# Patient Record
Sex: MALE | Race: WHITE | Employment: OTHER | URBAN - METROPOLITAN AREA
[De-identification: names, ages, dates, MRNs, and addresses within clinical notes are randomized per-mention and may not be internally consistent; named-entity substitution may affect disease eponyms.]

---

## 2019-02-11 ENCOUNTER — APPOINTMENT (OUTPATIENT)
Dept: GENERAL RADIOLOGY | Age: 84
DRG: 291 | End: 2019-02-11
Attending: EMERGENCY MEDICINE
Payer: MEDICARE

## 2019-02-11 ENCOUNTER — HOSPITAL ENCOUNTER (INPATIENT)
Age: 84
LOS: 5 days | Discharge: NURSING, REHAB OR LONG-TERM CARE | DRG: 291 | End: 2019-02-16
Attending: EMERGENCY MEDICINE | Admitting: HOSPITALIST
Payer: MEDICARE

## 2019-02-11 DIAGNOSIS — N18.9 CHRONIC KIDNEY DISEASE, UNSPECIFIED CKD STAGE: Primary | ICD-10-CM

## 2019-02-11 DIAGNOSIS — I48.91 NEW ONSET ATRIAL FIBRILLATION (HCC): ICD-10-CM

## 2019-02-11 DIAGNOSIS — I50.9 CONGESTIVE HEART FAILURE, UNSPECIFIED HF CHRONICITY, UNSPECIFIED HEART FAILURE TYPE (HCC): ICD-10-CM

## 2019-02-11 PROBLEM — N18.30 CKD (CHRONIC KIDNEY DISEASE) STAGE 3, GFR 30-59 ML/MIN (HCC): Status: ACTIVE | Noted: 2019-02-11

## 2019-02-11 LAB
ALBUMIN SERPL-MCNC: 3.9 G/DL (ref 3.4–5)
ALBUMIN/GLOB SERPL: 1.2 {RATIO} (ref 0.8–1.7)
ALP SERPL-CCNC: 116 U/L (ref 45–117)
ALT SERPL-CCNC: 22 U/L (ref 16–61)
ANION GAP SERPL CALC-SCNC: 8 MMOL/L (ref 3–18)
APTT PPP: 35.1 SEC (ref 23–36.4)
AST SERPL-CCNC: 13 U/L (ref 15–37)
BASOPHILS # BLD: 0 K/UL (ref 0–0.1)
BASOPHILS NFR BLD: 0 % (ref 0–2)
BILIRUB SERPL-MCNC: 0.5 MG/DL (ref 0.2–1)
BNP SERPL-MCNC: 8445 PG/ML (ref 0–1800)
BUN SERPL-MCNC: 56 MG/DL (ref 7–18)
BUN/CREAT SERPL: 19 (ref 12–20)
CALCIUM SERPL-MCNC: 8.4 MG/DL (ref 8.5–10.1)
CHLORIDE SERPL-SCNC: 108 MMOL/L (ref 100–108)
CK MB CFR SERPL CALC: 4.8 % (ref 0–4)
CK MB CFR SERPL CALC: 5 % (ref 0–4)
CK MB SERPL-MCNC: 4.3 NG/ML (ref 5–25)
CK MB SERPL-MCNC: 5.3 NG/ML (ref 5–25)
CK SERPL-CCNC: 111 U/L (ref 39–308)
CK SERPL-CCNC: 86 U/L (ref 39–308)
CO2 SERPL-SCNC: 22 MMOL/L (ref 21–32)
CREAT SERPL-MCNC: 2.88 MG/DL (ref 0.6–1.3)
DIFFERENTIAL METHOD BLD: ABNORMAL
EOSINOPHIL # BLD: 0.2 K/UL (ref 0–0.4)
EOSINOPHIL NFR BLD: 1 % (ref 0–5)
ERYTHROCYTE [DISTWIDTH] IN BLOOD BY AUTOMATED COUNT: 15.5 % (ref 11.6–14.5)
GLOBULIN SER CALC-MCNC: 3.2 G/DL (ref 2–4)
GLUCOSE SERPL-MCNC: 101 MG/DL (ref 74–99)
HCT VFR BLD AUTO: 33.1 % (ref 36–48)
HGB BLD-MCNC: 10.2 G/DL (ref 13–16)
LYMPHOCYTES # BLD: 1.1 K/UL (ref 0.9–3.6)
LYMPHOCYTES NFR BLD: 9 % (ref 21–52)
MAGNESIUM SERPL-MCNC: 2.7 MG/DL (ref 1.6–2.6)
MCH RBC QN AUTO: 27.3 PG (ref 24–34)
MCHC RBC AUTO-ENTMCNC: 30.8 G/DL (ref 31–37)
MCV RBC AUTO: 88.7 FL (ref 74–97)
MONOCYTES # BLD: 1.3 K/UL (ref 0.05–1.2)
MONOCYTES NFR BLD: 10 % (ref 3–10)
NEUTS SEG # BLD: 10.2 K/UL (ref 1.8–8)
NEUTS SEG NFR BLD: 80 % (ref 40–73)
PLATELET # BLD AUTO: 340 K/UL (ref 135–420)
PMV BLD AUTO: 11.2 FL (ref 9.2–11.8)
POTASSIUM SERPL-SCNC: 4.6 MMOL/L (ref 3.5–5.5)
PROT SERPL-MCNC: 7.1 G/DL (ref 6.4–8.2)
RBC # BLD AUTO: 3.73 M/UL (ref 4.7–5.5)
SODIUM SERPL-SCNC: 138 MMOL/L (ref 136–145)
T4 FREE SERPL-MCNC: 1.2 NG/DL (ref 0.7–1.5)
TROPONIN I SERPL-MCNC: 0.05 NG/ML (ref 0–0.04)
TROPONIN I SERPL-MCNC: 0.05 NG/ML (ref 0–0.04)
TSH SERPL DL<=0.05 MIU/L-ACNC: 2 UIU/ML (ref 0.36–3.74)
WBC # BLD AUTO: 12.8 K/UL (ref 4.6–13.2)

## 2019-02-11 PROCEDURE — 96376 TX/PRO/DX INJ SAME DRUG ADON: CPT

## 2019-02-11 PROCEDURE — 99285 EMERGENCY DEPT VISIT HI MDM: CPT

## 2019-02-11 PROCEDURE — 74011250636 HC RX REV CODE- 250/636: Performed by: HOSPITALIST

## 2019-02-11 PROCEDURE — 85730 THROMBOPLASTIN TIME PARTIAL: CPT

## 2019-02-11 PROCEDURE — 96374 THER/PROPH/DIAG INJ IV PUSH: CPT

## 2019-02-11 PROCEDURE — 83880 ASSAY OF NATRIURETIC PEPTIDE: CPT

## 2019-02-11 PROCEDURE — 84443 ASSAY THYROID STIM HORMONE: CPT

## 2019-02-11 PROCEDURE — 71045 X-RAY EXAM CHEST 1 VIEW: CPT

## 2019-02-11 PROCEDURE — 65610000006 HC RM INTENSIVE CARE

## 2019-02-11 PROCEDURE — 74011250636 HC RX REV CODE- 250/636: Performed by: EMERGENCY MEDICINE

## 2019-02-11 PROCEDURE — 74011000258 HC RX REV CODE- 258: Performed by: EMERGENCY MEDICINE

## 2019-02-11 PROCEDURE — 74011000250 HC RX REV CODE- 250: Performed by: EMERGENCY MEDICINE

## 2019-02-11 PROCEDURE — 74011250637 HC RX REV CODE- 250/637: Performed by: EMERGENCY MEDICINE

## 2019-02-11 PROCEDURE — 80053 COMPREHEN METABOLIC PANEL: CPT

## 2019-02-11 PROCEDURE — 83735 ASSAY OF MAGNESIUM: CPT

## 2019-02-11 PROCEDURE — 93005 ELECTROCARDIOGRAM TRACING: CPT

## 2019-02-11 PROCEDURE — 82550 ASSAY OF CK (CPK): CPT

## 2019-02-11 PROCEDURE — 84439 ASSAY OF FREE THYROXINE: CPT

## 2019-02-11 PROCEDURE — 85025 COMPLETE CBC W/AUTO DIFF WBC: CPT

## 2019-02-11 RX ORDER — DILTIAZEM HYDROCHLORIDE 5 MG/ML
0.35 INJECTION INTRAVENOUS
Status: COMPLETED | OUTPATIENT
Start: 2019-02-11 | End: 2019-02-11

## 2019-02-11 RX ORDER — HEPARIN SODIUM 1000 [USP'U]/ML
4000 INJECTION, SOLUTION INTRAVENOUS; SUBCUTANEOUS ONCE
Status: COMPLETED | OUTPATIENT
Start: 2019-02-11 | End: 2019-02-11

## 2019-02-11 RX ORDER — FUROSEMIDE 10 MG/ML
40 INJECTION INTRAMUSCULAR; INTRAVENOUS 2 TIMES DAILY
Status: DISCONTINUED | OUTPATIENT
Start: 2019-02-11 | End: 2019-02-16 | Stop reason: HOSPADM

## 2019-02-11 RX ORDER — METOPROLOL SUCCINATE 25 MG/1
25 TABLET, EXTENDED RELEASE ORAL DAILY
Status: DISCONTINUED | OUTPATIENT
Start: 2019-02-12 | End: 2019-02-13

## 2019-02-11 RX ORDER — DILTIAZEM HYDROCHLORIDE 5 MG/ML
0.25 INJECTION INTRAVENOUS
Status: COMPLETED | OUTPATIENT
Start: 2019-02-11 | End: 2019-02-11

## 2019-02-11 RX ORDER — METOPROLOL TARTRATE 25 MG/1
25 TABLET, FILM COATED ORAL ONCE
Status: COMPLETED | OUTPATIENT
Start: 2019-02-11 | End: 2019-02-11

## 2019-02-11 RX ORDER — PANTOPRAZOLE SODIUM 40 MG/1
40 TABLET, DELAYED RELEASE ORAL DAILY
Status: DISCONTINUED | OUTPATIENT
Start: 2019-02-12 | End: 2019-02-16 | Stop reason: HOSPADM

## 2019-02-11 RX ORDER — TERAZOSIN 5 MG/1
10 CAPSULE ORAL
Status: DISCONTINUED | OUTPATIENT
Start: 2019-02-11 | End: 2019-02-16 | Stop reason: HOSPADM

## 2019-02-11 RX ORDER — FUROSEMIDE 10 MG/ML
40 INJECTION INTRAMUSCULAR; INTRAVENOUS
Status: COMPLETED | OUTPATIENT
Start: 2019-02-11 | End: 2019-02-11

## 2019-02-11 RX ORDER — HEPARIN SODIUM 10000 [USP'U]/100ML
12-25 INJECTION, SOLUTION INTRAVENOUS
Status: DISCONTINUED | OUTPATIENT
Start: 2019-02-11 | End: 2019-02-16 | Stop reason: HOSPADM

## 2019-02-11 RX ORDER — DILTIAZEM HYDROCHLORIDE 60 MG/1
60 TABLET, FILM COATED ORAL
Status: COMPLETED | OUTPATIENT
Start: 2019-02-11 | End: 2019-02-11

## 2019-02-11 RX ADMIN — HEPARIN SODIUM AND DEXTROSE 12 UNITS/KG/HR: 10000; 5 INJECTION INTRAVENOUS at 18:25

## 2019-02-11 RX ADMIN — FUROSEMIDE 40 MG: 10 INJECTION, SOLUTION INTRAMUSCULAR; INTRAVENOUS at 20:11

## 2019-02-11 RX ADMIN — DILTIAZEM HYDROCHLORIDE 17 MG: 5 INJECTION INTRAVENOUS at 16:42

## 2019-02-11 RX ADMIN — METOPROLOL TARTRATE 25 MG: 25 TABLET ORAL at 18:22

## 2019-02-11 RX ADMIN — SODIUM CHLORIDE 250 ML: 900 INJECTION, SOLUTION INTRAVENOUS at 20:06

## 2019-02-11 RX ADMIN — DILTIAZEM HYDROCHLORIDE 60 MG: 60 TABLET, FILM COATED ORAL at 18:23

## 2019-02-11 RX ADMIN — DILTIAZEM HYDROCHLORIDE 24 MG: 5 INJECTION INTRAVENOUS at 17:06

## 2019-02-11 RX ADMIN — HEPARIN SODIUM 4000 UNITS: 1000 INJECTION INTRAVENOUS; SUBCUTANEOUS at 18:27

## 2019-02-11 RX ADMIN — FUROSEMIDE 40 MG: 10 INJECTION, SOLUTION INTRAMUSCULAR; INTRAVENOUS at 18:23

## 2019-02-11 RX ADMIN — SODIUM CHLORIDE 5 MG/HR: 900 INJECTION, SOLUTION INTRAVENOUS at 18:45

## 2019-02-11 NOTE — ED PROVIDER NOTES
EMERGENCY DEPARTMENT HISTORY AND PHYSICAL EXAM 
 
Date: 2/11/2019 Patient Name: Payal Pineda History of Presenting Illness Chief Complaint Patient presents with  Irregular Heart Beat History Provided By: Patient Chief Complaint: Dyspnea on exertion Duration: 2 days Timing: Acute Location: Chest 
Severity: Moderate Associated Symptoms: denies any other associated signs or symptoms Additional History (Context):  
4:20 PM 
Payal Pineda is a 80 y.o. male with PMHX hearing loss presents to the emergency department C/O Dyspnea on exertion onset 2 days ago. Pt denies any other associated signs or symptoms. He sees a Dermatologist for his lower legs. Former smoker, quit at the age of 58. Pt denies chest pain, abdominal pain, fever, vomiting, diarrhea, recent illness, any other PMHx, and any other sxs or complaints. PCP: Micheline Ashley MD 
 
Current Facility-Administered Medications Medication Dose Route Frequency Provider Last Rate Last Dose  furosemide (LASIX) injection 40 mg  40 mg IntraVENous NOW Marquise Starr DO      
 dilTIAZem (CARDIZEM) IR tablet 60 mg  60 mg Oral NOW Marquise Starr DO      
 heparin (porcine) 1,000 unit/mL injection 4,000 Units  4,000 Units IntraVENous ONCE Marquise Starr DO      
 heparin 25,000 units in D5W 250 ml infusion  12-25 Units/kg/hr IntraVENous TITRATE Marquise Starr DO      
 dilTIAZem (CARDIZEM) 100 mg in 0.9% sodium chloride (MBP/ADV) 100 mL infusion  0-15 mg/hr IntraVENous CONTINUOUS Marquise Starr DO      
 metoprolol tartrate (LOPRESSOR) tablet 25 mg  25 mg Oral ONCE Marquise Starr DO      
 
Current Outpatient Medications Medication Sig Dispense Refill  terazosin (HYTRIN) 10 mg capsule Take 10 mg by mouth nightly.     
 calcium-vitamin D (OYSTER SHELL) 500 mg(1,250mg) -200 unit per tablet Take 1 Tab by mouth two (2) times daily (with meals).  cyanocobalamin (VITAMIN B-12) 500 mcg tablet Take 500 mcg by mouth daily. Past History Past Medical History: 
Past Medical History:  
Diagnosis Date  Arrhythmia A-fib. On diltiazem drip.  Chronic kidney disease Cr 2.2 which is chronic - at least since 2011  Hypertension Past Surgical History: 
Past Surgical History:  
Procedure Laterality Date  HX ORTHOPAEDIC Back surgery Family History: 
History reviewed. No pertinent family history. Social History: 
Social History Tobacco Use  Smoking status: Former Smoker  Smokeless tobacco: Never Used Substance Use Topics  Alcohol use: No  
 Drug use: No  
 
 
Allergies: 
No Known Allergies Review of Systems Review of Systems Constitutional: Negative for fever. Respiratory:  
     (+) Dyspnea on exertion Cardiovascular: Negative for chest pain. Gastrointestinal: Negative for abdominal pain, diarrhea and vomiting. Physical Exam  
 
Vitals:  
 02/11/19 1700 02/11/19 1715 02/11/19 1730 02/11/19 1745 BP: 130/87 108/55 126/78 112/59 Pulse: (!) 112 99 96 (!) 102 Resp: 15 19 18 18 SpO2: 99% 97% 98% 97% Weight:      
Height:      
 
Physical Exam  
Nursing note and vitals reviewed. Constitutional: Elderly Caucasion male in no acute distress Head: Normocephalic, Atraumatic Eyes: Pupils are equal, round, and reactive to light, EOMI Neck: Supple, non-tender Cardiovascular: Irregularly irregular, tachycardic Chest: Normal work of breathing and chest excursion bilaterally Lungs: Clear to ausculation bilaterally, no wheezes, no rhonchi Abdomen: Soft, non tender, non distended, normoactive bowel sounds Back: No evidence of trauma or deformity Extremities: +3 pitting edema bilaterally with mild anterior erythema but no purulent discharge Skin: Warm and dry, normal cap refill Neuro: Alert and appropriate, CN intact, normal speech, moving all 4 extremities freely and symmetrically Psychiatric: Normal mood and affect Diagnostic Study Results Labs: 
  
Recent Results (from the past 12 hour(s)) CBC WITH AUTOMATED DIFF Collection Time: 02/11/19  4:20 PM  
Result Value Ref Range WBC 12.8 4.6 - 13.2 K/uL  
 RBC 3.73 (L) 4.70 - 5.50 M/uL  
 HGB 10.2 (L) 13.0 - 16.0 g/dL HCT 33.1 (L) 36.0 - 48.0 % MCV 88.7 74.0 - 97.0 FL  
 MCH 27.3 24.0 - 34.0 PG  
 MCHC 30.8 (L) 31.0 - 37.0 g/dL  
 RDW 15.5 (H) 11.6 - 14.5 % PLATELET 943 644 - 556 K/uL MPV 11.2 9.2 - 11.8 FL  
 NEUTROPHILS 80 (H) 40 - 73 % LYMPHOCYTES 9 (L) 21 - 52 % MONOCYTES 10 3 - 10 % EOSINOPHILS 1 0 - 5 % BASOPHILS 0 0 - 2 %  
 ABS. NEUTROPHILS 10.2 (H) 1.8 - 8.0 K/UL  
 ABS. LYMPHOCYTES 1.1 0.9 - 3.6 K/UL  
 ABS. MONOCYTES 1.3 (H) 0.05 - 1.2 K/UL  
 ABS. EOSINOPHILS 0.2 0.0 - 0.4 K/UL  
 ABS. BASOPHILS 0.0 0.0 - 0.1 K/UL  
 DF AUTOMATED METABOLIC PANEL, COMPREHENSIVE Collection Time: 02/11/19  4:20 PM  
Result Value Ref Range Sodium 138 136 - 145 mmol/L Potassium 4.6 3.5 - 5.5 mmol/L Chloride 108 100 - 108 mmol/L  
 CO2 22 21 - 32 mmol/L Anion gap 8 3.0 - 18 mmol/L Glucose 101 (H) 74 - 99 mg/dL BUN 56 (H) 7.0 - 18 MG/DL Creatinine 2.88 (H) 0.6 - 1.3 MG/DL  
 BUN/Creatinine ratio 19 12 - 20 GFR est AA 25 (L) >60 ml/min/1.73m2 GFR est non-AA 21 (L) >60 ml/min/1.73m2 Calcium 8.4 (L) 8.5 - 10.1 MG/DL Bilirubin, total 0.5 0.2 - 1.0 MG/DL  
 ALT (SGPT) 22 16 - 61 U/L  
 AST (SGOT) 13 (L) 15 - 37 U/L Alk. phosphatase 116 45 - 117 U/L Protein, total 7.1 6.4 - 8.2 g/dL Albumin 3.9 3.4 - 5.0 g/dL Globulin 3.2 2.0 - 4.0 g/dL A-G Ratio 1.2 0.8 - 1.7 NT-PRO BNP Collection Time: 02/11/19  4:20 PM  
Result Value Ref Range NT pro-BNP 8,445 (H) 0 - 1,800 PG/ML  
MAGNESIUM Collection Time: 02/11/19  4:20 PM  
Result Value Ref Range Magnesium 2.7 (H) 1.6 - 2.6 mg/dL CARDIAC PANEL,(CK, CKMB & TROPONIN) Collection Time: 02/11/19  4:20 PM  
Result Value Ref Range  39 - 308 U/L  
 CK - MB 5.3 (H) <3.6 ng/ml CK-MB Index 4.8 (H) 0.0 - 4.0 % Troponin-I, QT 0.05 (H) 0.0 - 0.045 NG/ML  
EKG, 12 LEAD, INITIAL Collection Time: 02/11/19  4:25 PM  
Result Value Ref Range Ventricular Rate 141 BPM  
 Atrial Rate 131 BPM  
 QRS Duration 70 ms Q-T Interval 280 ms QTC Calculation (Bezet) 428 ms Calculated R Axis 90 degrees Calculated T Axis -50 degrees Diagnosis Atrial fibrillation with rapid ventricular response with premature  
ventricular or aberrantly conducted complexes Rightward axis Septal infarct (cited on or before 11-FEB-2019) Abnormal ECG When compared with ECG of 07-MAR-2015 21:03, 
Previous ECG has undetermined rhythm, needs review Nonspecific T wave abnormality, worse in Inferior leads Nonspecific T wave abnormality now evident in Lateral leads Radiologic Studies: XR CHEST PORT Final Result IMPRESSION:  
  
Findings suggesting vascular congestion with small right pleural effusion. Additional very small left pleural effusion may be present as well. CT Results  (Last 48 hours) None CXR Results  (Last 48 hours) 02/11/19 1642  XR CHEST PORT Final result Impression:  IMPRESSION:  
   
Findings suggesting vascular congestion with small right pleural effusion. Additional very small left pleural effusion may be present as well. Narrative:  Portable Chest    
   
History: Shortness of breath, dyspnea on exertion Comparison: AP chest 03/06/2015 Portable view of the chest demonstrates stable cardiomediastinal silhouette. Interstitium is slightly indistinct and thickened suggesting vascular  
congestion. Right costophrenic angle is now blunted. Left costophrenic angle may be slightly blunted as well. No pneumothorax is seen. Cardiac monitoring leads  
are present. Degenerative changes are seen involving the shoulders. Degenerative  
changes are also present in the spine. Medical Decision Making I am the first provider for this patient. I reviewed the vital signs, available nursing notes, past medical history, past surgical history, family history and social history. Vital Signs: Reviewed the patient's vital signs. Pulse Oximetry Analysis: 100% on RA Cardiac Monitor: 
Rate: 141 bpm 
Rhythm: A-fib EKG interpretation: (Preliminary) 4:25 PM  
Atrial fibrillation with rapid ventricular response with premature ventricular or aberrantly conducted complexes at 141 bpm; Rightward axis; Septal infarct, age undetermined; QT/QTc at 280/428 ms; No STEPHANIE 
EKG read by Taylor Boas, DO at 4:27 PM  
 
Records Reviewed: Nursing Notes and Old Medical Records Provider Notes:  
80 y.o. male with no significant PMHx presenting from his doctor's office for complaints of dyspnea on exertion. On exam, pt is noted to be in new onset A-fib with RVR with HR in the 140s, but normotensive and +3 pitting edema bilaterally. Will evaluate for underlying etiology that may explain his A-fib including ACS, thyroid studies, and CHF. Pt will be given Cardiazem for rate control. Procedures: 
Procedures ED Course:  
4:20 PM Initial assessment performed. The patient's presenting problems have been discussed, and they are in agreement with the care plan formulated and outlined with them. I have encouraged them to ask questions as they arise throughout their visit. 5:46 PM Labs showing CKD Cr 2.8 ( baseline 2.3). Cardiac enzymes 0.05. BNP 8000. CXR with vascular congestion. Discussed patient's history, exam, and available diagnostics results with Fern Jacques MD, Cardiology, who recommends starting on Heparin drip, getting an Echo, and admission. 6:01 PM Pt persistently in A fib w/ RVR despite 2 doses of Cardizem. Discussed patient's history, exam, and available diagnostics results with Alfonso Kilpatrick MD, Cardiology, who recommends starting on Cardizem drip and oral Metoprolol 25mg BID. 6:05 PM Discussed patient's history, exam, and available diagnostics results with Zaina Mejia MD, Internal Medicine, who agrees to admit to ICU. Diagnosis and Disposition 5:48 PM 
I have spent 40 minutes of critical care time involved in lab review, consultations with specialist, family decision-making, and documentation. During this entire length of time I was immediately available to the patient. Critical Care: The reason for providing this level of medical care for this critically ill patient was due a critical illness that impaired one or more vital organ systems such that there was a high probability of imminent or life threatening deterioration in the patients condition. This care involved high complexity decision making to assess, manipulate, and support vital system functions, to treat this degreee vital organ system failure and to prevent further life threatening deterioration of the patients condition. Core Measures: 
For Hospitalized Patients: 
 
1. Hospitalization Decision Time: The decision to hospitalize the patient was made by Bernadette Artis DO at 5:46 PM on 2/11/2019 2. Aspirin: Aspirin was not given because the patient did not present with a stroke at the time of their Emergency Department evaluation 5:48 PM  Patient is being admitted to the hospital by Alfonso Kilpatrick MD. The results of their tests and reasons for their admission have been discussed with them and/or available family. They convey agreement and understanding for the need to be admitted and for their admission diagnosis. CONDITIONS ON ADMISSION: 
Sepsis is not present at the time of admission.  Urinary Tract Infection is not present at the time of admission. Pneumonia is not present at the time of admission. MRSA is not present at the time of admission. Wound infection is not present at the time of admission. Pressure Ulcer is not present at the time of admission. CLINICAL IMPRESSION: 
 
1. Chronic kidney disease, unspecified CKD stage 2. New onset atrial fibrillation ([address] 3. Congestive heart failure, unspecified HF chronicity, unspecified heart failure type ([facility] [address] Plan: 1. Admit to ICU Scribe Attestation: This note is prepared by Cesar Sams, acting as Scribe for Christi Benavidez DO. Provider Attestation: 
Christi Benavidez DO: The scribe's documentation has been prepared under my direction and personally reviewed by me in its entirety. I confirm that the note above accurately reflects all work, treatment, procedures, and medical decision making performed by me.

## 2019-02-11 NOTE — ED TRIAGE NOTES
Patient arrived via EMS with dyspnea on exertion, patient is in afib with rvr, a/ox3, patient speaking in complete sentences patinet in NAD

## 2019-02-12 ENCOUNTER — APPOINTMENT (OUTPATIENT)
Dept: NON INVASIVE DIAGNOSTICS | Age: 84
DRG: 291 | End: 2019-02-12
Attending: EMERGENCY MEDICINE
Payer: MEDICARE

## 2019-02-12 LAB
ANION GAP SERPL CALC-SCNC: 12 MMOL/L (ref 3–18)
APPEARANCE UR: CLEAR
APTT PPP: 146 SEC (ref 23–36.4)
APTT PPP: 48.6 SEC (ref 23–36.4)
APTT PPP: 52.2 SEC (ref 23–36.4)
APTT PPP: 84.5 SEC (ref 23–36.4)
APTT PPP: >180 SEC (ref 23–36.4)
BACTERIA URNS QL MICRO: ABNORMAL /HPF
BASOPHILS # BLD: 0 K/UL (ref 0–0.1)
BASOPHILS NFR BLD: 0 % (ref 0–2)
BILIRUB UR QL: NEGATIVE
BUN SERPL-MCNC: 58 MG/DL (ref 7–18)
BUN/CREAT SERPL: 20 (ref 12–20)
CALCIUM SERPL-MCNC: 8.1 MG/DL (ref 8.5–10.1)
CHLORIDE SERPL-SCNC: 108 MMOL/L (ref 100–108)
CK MB CFR SERPL CALC: 4.9 % (ref 0–4)
CK MB CFR SERPL CALC: 5 % (ref 0–4)
CK MB SERPL-MCNC: 3.5 NG/ML (ref 5–25)
CK MB SERPL-MCNC: 3.9 NG/ML (ref 5–25)
CK SERPL-CCNC: 72 U/L (ref 39–308)
CK SERPL-CCNC: 78 U/L (ref 39–308)
CO2 SERPL-SCNC: 20 MMOL/L (ref 21–32)
COLOR UR: YELLOW
CREAT SERPL-MCNC: 2.9 MG/DL (ref 0.6–1.3)
DIFFERENTIAL METHOD BLD: ABNORMAL
ECHO AO ASC DIAM: 2.81 CM
ECHO AO ROOT DIAM: 3.37 CM
ECHO AV AREA PEAK VELOCITY: 1.7 CM2
ECHO AV AREA VTI: 1.6 CM2
ECHO AV AREA/BSA PEAK VELOCITY: 1 CM2/M2
ECHO AV AREA/BSA VTI: 0.9 CM2/M2
ECHO AV CUSP MM: 1.32 CM
ECHO AV MEAN GRADIENT: 4.4 MMHG
ECHO AV PEAK GRADIENT: 7.1 MMHG
ECHO AV PEAK VELOCITY: 133.22 CM/S
ECHO AV REGURGITANT PHT: 493 CM
ECHO AV VTI: 24.65 CM
ECHO IVC SNIFF: 2.65 CM
ECHO LA MAJOR AXIS: 4.15 CM
ECHO LA VOL 2C: 34.41 ML (ref 18–58)
ECHO LA VOL 4C: 44.52 ML (ref 18–58)
ECHO LA VOL BP: 38.49 ML (ref 18–58)
ECHO LA VOL/BSA BIPLANE: 21.62 ML/M2 (ref 16–28)
ECHO LA VOLUME INDEX A2C: 19.33 ML/M2 (ref 16–28)
ECHO LA VOLUME INDEX A4C: 25.01 ML/M2 (ref 16–28)
ECHO LV E' LATERAL VELOCITY: 0.09 CM/S
ECHO LV E' SEPTAL VELOCITY: 0.08 CM/S
ECHO LV EDV A2C: 49.9 ML
ECHO LV EDV A4C: 35.2 ML
ECHO LV EDV BP: 44.1 ML (ref 67–155)
ECHO LV EDV INDEX A4C: 19.8 ML/M2
ECHO LV EDV INDEX BP: 24.8 ML/M2
ECHO LV EDV NDEX A2C: 28 ML/M2
ECHO LV EJECTION FRACTION A2C: 78 %
ECHO LV EJECTION FRACTION A4C: 65 %
ECHO LV EJECTION FRACTION BIPLANE: 72.8 % (ref 55–100)
ECHO LV ESV A2C: 11.1 ML
ECHO LV ESV A4C: 12.3 ML
ECHO LV ESV BP: 12 ML (ref 22–58)
ECHO LV ESV INDEX A2C: 6.2 ML/M2
ECHO LV ESV INDEX A4C: 6.9 ML/M2
ECHO LV ESV INDEX BP: 6.7 ML/M2
ECHO LV INTERNAL DIMENSION DIASTOLIC: 3.48 CM (ref 4.2–5.9)
ECHO LV INTERNAL DIMENSION SYSTOLIC: 2.23 CM
ECHO LV IVSD: 1.31 CM (ref 0.6–1)
ECHO LV MASS 2D: 175.8 G (ref 88–224)
ECHO LV MASS INDEX 2D: 98.8 G/M2 (ref 49–115)
ECHO LV POSTERIOR WALL DIASTOLIC: 1.28 CM (ref 0.6–1)
ECHO LVOT DIAM: 1.99 CM
ECHO LVOT PEAK GRADIENT: 2.2 MMHG
ECHO LVOT PEAK VELOCITY: 73.88 CM/S
ECHO LVOT VTI: 12.5 CM
ECHO MV A VELOCITY: 1.89 CM/S
ECHO MV AREA PHT: 3.5 CM2
ECHO MV E DECELERATION TIME (DT): 215 MS
ECHO MV E VELOCITY: 1.38 CM/S
ECHO MV E/A RATIO: 0.73
ECHO MV E/E' LATERAL: 15.33
ECHO MV E/E' RATIO (AVERAGED): 16.29
ECHO MV E/E' SEPTAL: 17.25
ECHO MV PRESSURE HALF TIME (PHT): 62.4 MS
ECHO PULMONARY ARTERY SYSTOLIC PRESSURE (PASP): 45 MMHG
ECHO RA AREA 4C: 16.56 CM2
ECHO RV INTERNAL DIMENSION: 3.19 CM
ECHO TRICUSPID ANNULAR PEAK SYSTOLIC VELOCITY: 1.4 CM/S
ECHO TV REGURGITANT MAX VELOCITY: 306.21 CM/S
ECHO TV REGURGITANT PEAK GRADIENT: 37.5 MMHG
EOSINOPHIL # BLD: 0.1 K/UL (ref 0–0.4)
EOSINOPHIL NFR BLD: 1 % (ref 0–5)
EPITH CASTS URNS QL MICRO: NEGATIVE /LPF (ref 0–5)
ERYTHROCYTE [DISTWIDTH] IN BLOOD BY AUTOMATED COUNT: 15.4 % (ref 11.6–14.5)
GLUCOSE SERPL-MCNC: 99 MG/DL (ref 74–99)
GLUCOSE UR STRIP.AUTO-MCNC: NEGATIVE MG/DL
HCT VFR BLD AUTO: 31.5 % (ref 36–48)
HGB BLD-MCNC: 9.6 G/DL (ref 13–16)
HGB UR QL STRIP: ABNORMAL
KETONES UR QL STRIP.AUTO: NEGATIVE MG/DL
LEUKOCYTE ESTERASE UR QL STRIP.AUTO: ABNORMAL
LYMPHOCYTES # BLD: 1.2 K/UL (ref 0.9–3.6)
LYMPHOCYTES NFR BLD: 12 % (ref 21–52)
MCH RBC QN AUTO: 26.9 PG (ref 24–34)
MCHC RBC AUTO-ENTMCNC: 30.5 G/DL (ref 31–37)
MCV RBC AUTO: 88.2 FL (ref 74–97)
MONOCYTES # BLD: 1.1 K/UL (ref 0.05–1.2)
MONOCYTES NFR BLD: 10 % (ref 3–10)
NEUTS SEG # BLD: 8.2 K/UL (ref 1.8–8)
NEUTS SEG NFR BLD: 77 % (ref 40–73)
NITRITE UR QL STRIP.AUTO: NEGATIVE
PH UR STRIP: 5 [PH] (ref 5–8)
PISA AR MAX VEL: 407.13 CM/S
PLATELET # BLD AUTO: 281 K/UL (ref 135–420)
PMV BLD AUTO: 11.4 FL (ref 9.2–11.8)
POTASSIUM SERPL-SCNC: 4.2 MMOL/L (ref 3.5–5.5)
PROT UR STRIP-MCNC: NEGATIVE MG/DL
RBC # BLD AUTO: 3.57 M/UL (ref 4.7–5.5)
RBC #/AREA URNS HPF: ABNORMAL /HPF (ref 0–5)
SODIUM SERPL-SCNC: 140 MMOL/L (ref 136–145)
SP GR UR REFRACTOMETRY: 1.01 (ref 1–1.03)
TROPONIN I SERPL-MCNC: 0.05 NG/ML (ref 0–0.04)
TROPONIN I SERPL-MCNC: 0.05 NG/ML (ref 0–0.04)
UROBILINOGEN UR QL STRIP.AUTO: 0.2 EU/DL (ref 0.2–1)
WBC # BLD AUTO: 10.7 K/UL (ref 4.6–13.2)

## 2019-02-12 PROCEDURE — 93306 TTE W/DOPPLER COMPLETE: CPT

## 2019-02-12 PROCEDURE — 74011250636 HC RX REV CODE- 250/636: Performed by: HOSPITALIST

## 2019-02-12 PROCEDURE — 82550 ASSAY OF CK (CPK): CPT

## 2019-02-12 PROCEDURE — 85730 THROMBOPLASTIN TIME PARTIAL: CPT

## 2019-02-12 PROCEDURE — 74011000250 HC RX REV CODE- 250: Performed by: INTERNAL MEDICINE

## 2019-02-12 PROCEDURE — 74011250637 HC RX REV CODE- 250/637: Performed by: HOSPITALIST

## 2019-02-12 PROCEDURE — 80048 BASIC METABOLIC PNL TOTAL CA: CPT

## 2019-02-12 PROCEDURE — 85025 COMPLETE CBC W/AUTO DIFF WBC: CPT

## 2019-02-12 PROCEDURE — 74011250636 HC RX REV CODE- 250/636: Performed by: INTERNAL MEDICINE

## 2019-02-12 PROCEDURE — 74011250637 HC RX REV CODE- 250/637: Performed by: INTERNAL MEDICINE

## 2019-02-12 PROCEDURE — 65610000006 HC RM INTENSIVE CARE

## 2019-02-12 PROCEDURE — 81001 URINALYSIS AUTO W/SCOPE: CPT

## 2019-02-12 PROCEDURE — 36415 COLL VENOUS BLD VENIPUNCTURE: CPT

## 2019-02-12 PROCEDURE — 94640 AIRWAY INHALATION TREATMENT: CPT

## 2019-02-12 RX ORDER — DILTIAZEM HYDROCHLORIDE 120 MG/1
120 CAPSULE, COATED, EXTENDED RELEASE ORAL DAILY
Status: DISCONTINUED | OUTPATIENT
Start: 2019-02-12 | End: 2019-02-14

## 2019-02-12 RX ORDER — IPRATROPIUM BROMIDE 0.5 MG/2.5ML
0.5 SOLUTION RESPIRATORY (INHALATION)
Status: DISCONTINUED | OUTPATIENT
Start: 2019-02-12 | End: 2019-02-16 | Stop reason: HOSPADM

## 2019-02-12 RX ORDER — HEPARIN SODIUM 1000 [USP'U]/ML
3000 INJECTION, SOLUTION INTRAVENOUS; SUBCUTANEOUS ONCE
Status: COMPLETED | OUTPATIENT
Start: 2019-02-12 | End: 2019-02-12

## 2019-02-12 RX ORDER — BUDESONIDE 0.5 MG/2ML
500 INHALANT ORAL
Status: DISCONTINUED | OUTPATIENT
Start: 2019-02-12 | End: 2019-02-16 | Stop reason: HOSPADM

## 2019-02-12 RX ORDER — IPRATROPIUM BROMIDE 0.5 MG/2.5ML
0.5 SOLUTION RESPIRATORY (INHALATION)
Status: COMPLETED | OUTPATIENT
Start: 2019-02-12 | End: 2019-02-12

## 2019-02-12 RX ADMIN — TERAZOSIN HYDROCHLORIDE 10 MG: 5 CAPSULE ORAL at 00:59

## 2019-02-12 RX ADMIN — PANTOPRAZOLE SODIUM 40 MG: 40 TABLET, DELAYED RELEASE ORAL at 08:29

## 2019-02-12 RX ADMIN — FUROSEMIDE 40 MG: 10 INJECTION, SOLUTION INTRAMUSCULAR; INTRAVENOUS at 20:15

## 2019-02-12 RX ADMIN — FUROSEMIDE 40 MG: 10 INJECTION, SOLUTION INTRAMUSCULAR; INTRAVENOUS at 08:29

## 2019-02-12 RX ADMIN — BUDESONIDE 500 MCG: 0.5 INHALANT RESPIRATORY (INHALATION) at 12:08

## 2019-02-12 RX ADMIN — HEPARIN SODIUM 3000 UNITS: 1000 INJECTION INTRAVENOUS; SUBCUTANEOUS at 12:02

## 2019-02-12 RX ADMIN — DILTIAZEM HYDROCHLORIDE 120 MG: 120 CAPSULE, COATED, EXTENDED RELEASE ORAL at 18:06

## 2019-02-12 RX ADMIN — TERAZOSIN HYDROCHLORIDE 10 MG: 5 CAPSULE ORAL at 23:04

## 2019-02-12 RX ADMIN — BUDESONIDE 500 MCG: 0.5 INHALANT RESPIRATORY (INHALATION) at 21:48

## 2019-02-12 RX ADMIN — IPRATROPIUM BROMIDE 0.5 MG: 0.5 SOLUTION RESPIRATORY (INHALATION) at 12:08

## 2019-02-12 RX ADMIN — METOPROLOL SUCCINATE 25 MG: 25 TABLET, EXTENDED RELEASE ORAL at 08:29

## 2019-02-12 NOTE — PROGRESS NOTES
Physical Therapy Screening: 
Services are indicated and therapy order is required. An InBasket screening referral was triggered for physical therapy based on results obtained during the nursing admission assessment. The patients chart was reviewed and the patient is appropriate for a skilled therapy evaluation. Please order a consult for physical therapy if you are in agreement and would like an evaluation to be completed. Thank you.  
 
Nikki Or, DEJUAN

## 2019-02-12 NOTE — CONSULTS
[facility] Consult Note      Patient: Payal Pineda MRN: [id number]  SSN: [id number]    YOB: 1924  Age: 80 y.o. Sex: male    Date of Consultation: 02/11/2019  Referring Physician: Dr. Gregory Castillo  Reason for Consultation: atrial fibrillation with RVR and congestive heart failure. History of present illness. .  I was asked by Dr. Gregory Castillo to see this pleasan patient for atrial fibrillation and congestive heart failure. Payal Pineda is a 80years old pleasant gentleman was seen in a primary care  Physician, Dr. Nunes  office and patient was found to have atrial fibrillation with RVR, shortness of breath and ankle swelling and then transferred to the ER. Patient's past medical history significant for chronic kidney disease, hypertension, chronic mild deafness and mild dementia. Patient denied any fever cough or pleuritic chest pain. Patient have mild to moderate bilateral ankle swelling. Past Medical History:   Diagnosis Date    [facility]-fib [facility])     CHF (congestive heart failure) ([facility] [address] 2/11/2019    Chronic kidney disease     Cr 2.2 which is chronic - at least since 2011    Hypertension      Past Surgical History:   Procedure Laterality Date    HX ORTHOPAEDIC      Back surgery     Current Facility-Administered Medications   Medication Dose Route Frequency    heparin 25,000 units in D5W 250 ml infusion  12-25 Units/kg/hr IntraVENous TITRATE    dilTIAZem (CARDIZEM) 100 mg in 0.9% sodium chloride (MBP/ADV) 100 mL infusion  0-15 mg/hr IntraVENous CONTINUOUS    terazosin (HYTRIN) capsule 10 mg  10 mg Oral QHS    furosemide (LASIX) injection 40 mg  40 mg IntraVENous BID    [START ON 2/12/2019] pantoprazole (PROTONIX) tablet 40 mg  40 mg Oral DAILY    sodium chloride 0.9 % bolus infusion 250 mL  250 mL IntraVENous ONCE     Current Outpatient Medications   Medication Sig    terazosin (HYTRIN) 10 mg capsule Take 10 mg by mouth nightly.     calcium-vitamin D (OYSTER SHELL) 500 mg(1,250mg) -200 unit per tablet Take 1 Tab by mouth two (2) times daily (with meals).  cyanocobalamin (VITAMIN B-12) 500 mcg tablet Take 500 mcg by mouth daily. Allergies and Intolerances:   No Known Allergies    Family History:   History reviewed. No pertinent family history. Social History:   He  reports that he has quit smoking. he has never used smokeless tobacco.  He  reports that he does not drink alcohol. Review of Systems:     Review of Systems  Gen: No fever, chills, malaise, weight loss/gain. Heent: No headache, rhinorrhea, epistaxis, ear pain, hearing loss, sinus pain, neck pain/stiffness, sore throat. Heart: No chest pain, +palpitations, +DAVIS, pnd, or orthopnea. ++ ankle edema  Resp: No cough, hemoptysis, wheezing and shortness of breath. GI: No nausea, vomiting, diarrhea, constipation, melena or hematochezia. : No urinary obstruction, dysuria or hematuria. Derm: No rash, new skin lesion or pruritis. Musc/skeletal: no bone or joint complains. Vasc: No edema, cyanosis or claudication. Endo: No heat/cold intolerance, no polyuria,polydipsia or polyphagia. Neuro: No unilateral weakness, numbness, tingling. No seizures. Heme: No easy bruising or bleeding.         Physical:   Patient Vitals for the past 6 hrs:   Temp Pulse Resp BP SpO2   02/11/19 2030  88 19 110/56 98 %   02/11/19 2015  88 13 96/75 99 %   02/11/19 1945  79 17 90/65 99 %   02/11/19 1930  80 15 103/65 99 %   02/11/19 1915  (!) 104 17 96/64 98 %   02/11/19 1900  96 21 107/66 98 %   02/11/19 1851  97 18  99 %   02/11/19 1845  (!) 107 18 120/78    02/11/19 1830  (!) 116 22 116/72 100 %   02/11/19 1815  (!) 103 19 130/79 98 %   02/11/19 1800  (!) 112 22 117/70 98 %   02/11/19 1745  (!) 102 18 112/59 97 %   02/11/19 1730  96 18 126/78 98 %   02/11/19 1715  99 19 108/55 97 %   02/11/19 1700  (!) 112 15 130/87 99 %   02/11/19 1645  (!) 107 19 110/67 100 %   02/11/19 1630  (!) 147 24 140/83    02/11/19 1626 98 °F (36.7 °C) (!) 150 16 (!) 154/104 100 %         Exam:   General Appearance: Comfortable, not using accessory muscles of respiration. HEENT: DAYRON. HEAD: Atraumatic  NECK: No JVD, no thyroidomeglay. CAROTIDS:clear  LUNGS: Clear bilaterally. HEART: S1 variable +S2     ABD: Non-tender, BS Audible    EXT: ++ edema, and no cysnosis. VASCULAR EXAM: Pulses are intact. PSYCHIATRIC EXAM: Mood is appropriate. MUSCULOSKELETAL: Grossly no joint deformity. NEUROLOGICAL: Motor and sensory sytem intact and Cranial nerves II-XII intact.     Review of Data:   LABS:   Lab Results   Component Value Date/Time    WBC 12.8 02/11/2019 04:20 PM    HGB 10.2 (L) 02/11/2019 04:20 PM    HCT 33.1 (L) 02/11/2019 04:20 PM    PLATELET 634 10/61/6592 04:20 PM     Lab Results   Component Value Date/Time    Sodium 138 02/11/2019 04:20 PM    Potassium 4.6 02/11/2019 04:20 PM    Chloride 108 02/11/2019 04:20 PM    CO2 22 02/11/2019 04:20 PM    Glucose 101 (H) 02/11/2019 04:20 PM    BUN 56 (H) 02/11/2019 04:20 PM    Creatinine 2.88 (H) 02/11/2019 04:20 PM     No results found for: CHOL, CHOLX, CHLST, CHOLV, HDL, LDL, LDLC, DLDLP, TGLX, TRIGL, TRIGP  No results found for: GPT  No results found for: HBA1C, HGBE8, QWG2PIRN, GQZ6OYVU      Cardiology Procedures:   Results for orders placed or performed during the hospital encounter of 02/11/19   EKG, 12 LEAD, INITIAL   Result Value Ref Range    Ventricular Rate 141 BPM    Atrial Rate 131 BPM    QRS Duration 70 ms    Q-T Interval 280 ms    QTC Calculation (Bezet) 428 ms    Calculated R Axis 90 degrees    Calculated T Axis -50 degrees    Diagnosis       Atrial fibrillation with rapid ventricular response with premature   ventricular or aberrantly conducted complexes  Rightward axis  Septal infarct (cited on or before 11-FEB-2019)  Abnormal ECG  When compared with ECG of 07-MAR-2015 21:03,  Previous ECG has undetermined rhythm, needs review  Nonspecific T wave abnormality, worse in Inferior leads  Nonspecific T wave abnormality now evident in Lateral leads             Impression / Plan:    Patient Active Problem List   Diagnosis Code    CHF (congestive heart failure) ([facility]) I50.9    New onset atrial fibrillation ([facility]) I48.91    CKD (chronic kidney disease) stage 3, GFR 30-59 ml/min ([facility]) N18.3    Hypertension I10     Assessment and plan    Acute congestive heart failure- type unknown  Acute new onset atrial fibrillation with RVR  Hypertension  Chronic kidney disease      Plan  Start   Lasix  heparin intravenous infusion- patient is a candidate for long-term anticoagulation. Cardizem infusion  Start metoprolol succinate 25 mg daily  Rest of the recommendation after echocardiography  Discussed with the patient.     Signed By: Kevon Soulier, MD     February 11, 2019

## 2019-02-12 NOTE — PROGRESS NOTES
Hospitalist Progress Note Patient: Payal Pineda MRN: [id number]  CSN: [id number] YOB: 1924  Age: 80 y.o. Sex: male DOA: 2/11/2019 LOS:  LOS: 1 day Assessment/Plan Patient Active Problem List  
Diagnosis Code  CHF (congestive heart failure) ([facility]) I50.9  New onset atrial fibrillation ([facility]) I48.91  
 CKD (chronic kidney disease) stage 3, GFR 30-59 ml/min ([facility]) N18.3  Hypertension I10  
  
 
 
 
81 yo male admitted for A-fib with RVR, SOB. Patient comfortable, denies any complains. His HR is still variable and going as high as 130s. No acute events overnight. CRITICAL CARE PLAN 
  
Resp - No acute respiratory issues, will oxygen by NC as needed.  
  
ID - No evidence of infection 
  
[facility] - Monitor HD. A-fib with RVR -  on cardizem drip, lporessor heparin drip. TSH in normal range. CHF - acute on chronic diastolic Continue with lasix. CE flat Echo with EF of 61-65%, moderate concentric LV hypertrophy. Cardiology following. 
  
Heme/onc - Follow H&H, plts. 
  
Renal - Trend BUN, Cr, follow I/O. Check and replace Mg, K, phos. CKD - stage 3, monitor in the setting of diuresis. 
  
Endocrine -   
TSH in normal range 
  
GI - cardiac diet. 
  
Prophylaxis - DVT: heparin, GI: protonix 
  
35 minutes of critical care time spent in the direct evaluation and treatment of this high risk patient. The reason for providing this level of medical care for this critically ill patient was due a critical illness that impaired one or more vital organ systems such that there was a high probability of imminent or life threatening deterioration in the patients condition. This care involved high complexity decision making to assess, manipulate, and support vital system functions, to treat this degreee vital organ system failure and to prevent further life threatening deterioration of the patients condition. Disposition : 2-3 days Review of systems General: No fevers or chills. Cardiovascular: No chest pain or pressure. No palpitations. Pulmonary: No shortness of breath. Gastrointestinal: No nausea, vomiting. Physical Exam: 
General: Awake, cooperative, no acute distress   
HEENT: NC, Atraumatic. PERRLA, anicteric sclerae. Lungs: CTA Bilaterally. No Wheezing/Rhonchi/Rales. Heart:  S1 S2,  No murmur, No Rubs, No Gallops Abdomen: Soft, Non distended, Non tender.  +Bowel sounds, Extremities: No c/c/e Psych:   Not anxious or agitated. Neurologic:  No acute neurological deficit. Vital signs/Intake and Output: 
Visit Vitals /77 Pulse (!) 122 Temp 97.7 °F (36.5 °C) Resp 13 Ht 5' 6\" (1.676 m) Wt 68.9 kg (152 lb) SpO2 100% BMI 24.53 kg/m² Current Shift:  02/12 0701 - 02/12 1900 In: -  
Out: 750 [Urine:750] Last three shifts:  02/10 1901 - 02/12 0700 In: 55.6 [I.V.:55.6] Out: 775 [Urine:775] Labs: Results:  
   
Chemistry Recent Labs  
  02/12/19 
0515 02/11/19 
1620 GLU 99 101*  138  
K 4.2 4.6  108 CO2 20* 22 BUN 58* 56* CREA 2.90* 2.88* CA 8.1* 8.4* AGAP 12 8 BUCR 20 19 AP  --  116 TP  --  7.1 ALB  --  3.9 GLOB  --  3.2 AGRAT  --  1.2  
  
CBC w/Diff Recent Labs  
  02/12/19 
0515 02/11/19 
1620 WBC 10.7 12.8 RBC 3.57* 3.73* HGB 9.6* 10.2* HCT 31.5* 33.1*  
 340 GRANS 77* 80* LYMPH 12* 9* EOS 1 1 Cardiac Enzymes Recent Labs  
  02/12/19 
0515 02/12/19 
0020 CPK 72 78 CKND1 4.9* 5.0* Coagulation Recent Labs  
  02/12/19 
1006 02/12/19 
0515 APTT 52.2* 84.5* Lipid Panel No results found for: CHOL, CHOLPOCT, CHOLX, CHLST, CHOLV, 711981, HDL, LDL, LDLC, DLDLP, 237716, VLDLC, VLDL, TGLX, TRIGL, TRIGP, TGLPOCT, CHHD, CHHDX  
BNP No results for input(s): BNPP in the last 72 hours. Liver Enzymes Recent Labs  
  02/11/19 
1620 TP 7.1 ALB 3.9  SGOT 13* Thyroid Studies Lab Results Component Value Date/Time TSH 2.00 02/11/2019 04:20 PM  
    
Procedures/imaging: see electronic medical records for all procedures/Xrays and details which were not copied into this note but were reviewed prior to creation of Plan

## 2019-02-12 NOTE — CONSULTS
[facility]  Pulmonary, Critical Care, and Sleep Medicine    Initial Patient Consult    Name: Shea Law MRN: [id number]   : 11/3/1924 Hospital: [facility] [place]   Date: 2019  Room: [facility]     Subjective: This patient has been seen and evaluated at the request of Dr. Octavia Liriano for icu admission for Afib with RVR. Patient is a 80 y.o. male with hx of HTN. He went to PCP office yesterday because of swelling in his legs and SOB. He was found to have fast Afib and advised to come to THE [facility] ER. Patient is in icu. He is awake, alert. He denies CP or SOB. He is afebrile. BP stable. He is on heparin drip. Tele - Afib with rate 120s    SH - stopped smoking at age 58; hx of 40 yrs smoking at < 1 ppd; stopped alcohol in 46s. Past Medical History:   Diagnosis Date    A-fib [facility])     CHF (congestive heart failure) ([facility] [address] 2019    Chronic kidney disease     Cr 2.2 which is chronic - at least since     Hypertension       Past Surgical History:   Procedure Laterality Date    HX ORTHOPAEDIC      Back surgery      Prior to Admission medications    Medication Sig Start Date End Date Taking? Authorizing Provider   terazosin (HYTRIN) 10 mg capsule Take 10 mg by mouth nightly. Yes Catrachita, MD Mauro   calcium-vitamin D (OYSTER SHELL) 500 mg(1,250mg) -200 unit per tablet Take 1 Tab by mouth two (2) times daily (with meals). Yes Catrachita, MD Mauro   cyanocobalamin (VITAMIN B-12) 500 mcg tablet Take 500 mcg by mouth daily. Yes Catrachita, MD Mauro     No Known Allergies   Social History     Tobacco Use    Smoking status: Former Smoker    Smokeless tobacco: Never Used   Substance Use Topics    Alcohol use: No      History reviewed. No pertinent family history.      Current Facility-Administered Medications   Medication Dose Route Frequency    heparin 25,000 units in D5W 250 ml infusion  12-25 Units/kg/hr IntraVENous TITRATE    dilTIAZem (CARDIZEM) 100 mg in 0.9% sodium chloride (MBP/ADV) 100 mL infusion  0-15 mg/hr IntraVENous CONTINUOUS    terazosin (HYTRIN) capsule 10 mg  10 mg Oral QHS    furosemide (LASIX) injection 40 mg  40 mg IntraVENous BID    pantoprazole (PROTONIX) tablet 40 mg  40 mg Oral DAILY    metoprolol succinate (TOPROL-XL) XL tablet 25 mg  25 mg Oral DAILY       Review of Systems:  Ears, nose, mouth, throat, and face: No epistaxis, no difficulty in swallowing  Respiratory: no cough or SOB or hemoptysis  Cardiovascular: no chest pain or palpitations, + bilateral leg edema, no syncope  Gastrointestinal: no abd pain, vomitting or diarrhea, no bleeding symptoms  Genitourinary: No urinary symptoms  Integument/breast: No ulcers  Musculoskeletal:Neg  Neurological: No focal weakness or seizures or headaches  Behvioral/Psych: No anxiety or depression  Constitutional: No fever or chills or weight loss or night sweats       Objective:   Vital Signs:    Visit Vitals  /68   Pulse (!) 104   Temp 97.7 °F (36.5 °C)   Resp 13   Ht 5' 6\" (1.676 m)   Wt 68.9 kg (152 lb)   SpO2 99%   BMI 24.53 kg/m²       O2 Device: Room air       Temp (24hrs), Av.9 °F (36.6 °C), Min:97.7 °F (36.5 °C), Max:98 °F (36.7 °C)       Intake/Output:   Last shift:       07 -  190  In: -   Out: 500 [Urine:500]  Last 3 shifts: 02/10 190 -  0700  In: 55.6 [I.V.:55.6]  Out: 775 [Urine:775]    Intake/Output Summary (Last 24 hours) at 2019 1130  Last data filed at 2019 1025  Gross per 24 hour   Intake 55.55 ml   Output 1275 ml   Net -1219.45 ml          Physical Exam:   Comfortable; on room air; acyanotic; appears good than stated age  HEENT: pupils not dilated, no scleral jaundice, moist oral mucosa  Neck: No adenopathy or thyroid swelling  CVS: S1S2 no murmurs; JVD not elevated  RS: Mod air entry bilaterally, mild bilateral wheezes; no crackles; normal respirations  Abd: soft, non tender, no hepatosplenomegaly, no abd distension, no guarding or rigidity, bowel sounds heard  Neuro: non focal, awake, alert  Extrm: mild bilateral pitting leg edema   Skin: no rash  Lymphatic: no cervical or supraclavicular adenopathy  Psych: normal mood      Data review:     Recent Results (from the past 24 hour(s))   CBC WITH AUTOMATED DIFF    Collection Time: 02/11/19  4:20 PM   Result Value Ref Range    WBC 12.8 4.6 - 13.2 K/uL    RBC 3.73 (L) 4.70 - 5.50 M/uL    HGB 10.2 (L) 13.0 - 16.0 g/dL    HCT 33.1 (L) 36.0 - 48.0 %    MCV 88.7 74.0 - 97.0 FL    MCH 27.3 24.0 - 34.0 PG    MCHC 30.8 (L) 31.0 - 37.0 g/dL    RDW 15.5 (H) 11.6 - 14.5 %    PLATELET 153 705 - 885 K/uL    MPV 11.2 9.2 - 11.8 FL    NEUTROPHILS 80 (H) 40 - 73 %    LYMPHOCYTES 9 (L) 21 - 52 %    MONOCYTES 10 3 - 10 %    EOSINOPHILS 1 0 - 5 %    BASOPHILS 0 0 - 2 %    ABS. NEUTROPHILS 10.2 (H) 1.8 - 8.0 K/UL    ABS. LYMPHOCYTES 1.1 0.9 - 3.6 K/UL    ABS. MONOCYTES 1.3 (H) 0.05 - 1.2 K/UL    ABS. EOSINOPHILS 0.2 0.0 - 0.4 K/UL    ABS. BASOPHILS 0.0 0.0 - 0.1 K/UL    DF AUTOMATED     METABOLIC PANEL, COMPREHENSIVE    Collection Time: 02/11/19  4:20 PM   Result Value Ref Range    Sodium 138 136 - 145 mmol/L    Potassium 4.6 3.5 - 5.5 mmol/L    Chloride 108 100 - 108 mmol/L    CO2 22 21 - 32 mmol/L    Anion gap 8 3.0 - 18 mmol/L    Glucose 101 (H) 74 - 99 mg/dL    BUN 56 (H) 7.0 - 18 MG/DL    Creatinine 2.88 (H) 0.6 - 1.3 MG/DL    BUN/Creatinine ratio 19 12 - 20      GFR est AA 25 (L) >60 ml/min/1.73m2    GFR est non-AA 21 (L) >60 ml/min/1.73m2    Calcium 8.4 (L) 8.5 - 10.1 MG/DL    Bilirubin, total 0.5 0.2 - 1.0 MG/DL    ALT (SGPT) 22 16 - 61 U/L    AST (SGOT) 13 (L) 15 - 37 U/L    Alk.  phosphatase 116 45 - 117 U/L    Protein, total 7.1 6.4 - 8.2 g/dL    Albumin 3.9 3.4 - 5.0 g/dL    Globulin 3.2 2.0 - 4.0 g/dL    A-G Ratio 1.2 0.8 - 1.7     NT-PRO BNP    Collection Time: 02/11/19  4:20 PM   Result Value Ref Range    NT pro-BNP 8,445 (H) 0 - 1,800 PG/ML   MAGNESIUM    Collection Time: 02/11/19  4:20 PM   Result Value Ref Range Magnesium 2.7 (H) 1.6 - 2.6 mg/dL   CARDIAC PANEL,(CK, CKMB & TROPONIN)    Collection Time: 02/11/19  4:20 PM   Result Value Ref Range     39 - 308 U/L    CK - MB 5.3 (H) <3.6 ng/ml    CK-MB Index 4.8 (H) 0.0 - 4.0 %    Troponin-I, QT 0.05 (H) 0.0 - 0.045 NG/ML   TSH 3RD GENERATION    Collection Time: 02/11/19  4:20 PM   Result Value Ref Range    TSH 2.00 0.36 - 3.74 uIU/mL   T4, FREE    Collection Time: 02/11/19  4:20 PM   Result Value Ref Range    T4, Free 1.2 0.7 - 1.5 NG/DL   PTT    Collection Time: 02/11/19  4:20 PM   Result Value Ref Range    aPTT 35.1 23.0 - 36.4 SEC   EKG, 12 LEAD, INITIAL    Collection Time: 02/11/19  4:25 PM   Result Value Ref Range    Ventricular Rate 141 BPM    Atrial Rate 131 BPM    QRS Duration 70 ms    Q-T Interval 280 ms    QTC Calculation (Bezet) 428 ms    Calculated R Axis 90 degrees    Calculated T Axis -50 degrees    Diagnosis       Atrial fibrillation with rapid ventricular response with premature   ventricular or aberrantly conducted complexes  Rightward axis  Septal infarct (cited on or before 11-FEB-2019)  Abnormal ECG  When compared with ECG of 07-MAR-2015 21:03,  Previous ECG has undetermined rhythm, needs review  Nonspecific T wave abnormality, worse in Inferior leads  Nonspecific T wave abnormality now evident in Lateral leads     CARDIAC PANEL,(CK, CKMB & TROPONIN)    Collection Time: 02/11/19  8:05 PM   Result Value Ref Range    CK 86 39 - 308 U/L    CK - MB 4.3 (H) <3.6 ng/ml    CK-MB Index 5.0 (H) 0.0 - 4.0 %    Troponin-I, QT 0.05 (H) 0.0 - 0.045 NG/ML   PTT    Collection Time: 02/12/19 12:20 AM   Result Value Ref Range    aPTT >180.0 (HH) 23.0 - 36.4 SEC   CARDIAC PANEL,(CK, CKMB & TROPONIN)    Collection Time: 02/12/19 12:20 AM   Result Value Ref Range    CK 78 39 - 308 U/L    CK - MB 3.9 (H) <3.6 ng/ml    CK-MB Index 5.0 (H) 0.0 - 4.0 %    Troponin-I, QT 0.05 (H) 0.0 - 0.045 NG/ML   PTT    Collection Time: 02/12/19  5:15 AM   Result Value Ref Range    aPTT 84. 5 (H) 23.0 - 36.4 SEC   CBC WITH AUTOMATED DIFF    Collection Time: 02/12/19  5:15 AM   Result Value Ref Range    WBC 10.7 4.6 - 13.2 K/uL    RBC 3.57 (L) 4.70 - 5.50 M/uL    HGB 9.6 (L) 13.0 - 16.0 g/dL    HCT 31.5 (L) 36.0 - 48.0 %    MCV 88.2 74.0 - 97.0 FL    MCH 26.9 24.0 - 34.0 PG    MCHC 30.5 (L) 31.0 - 37.0 g/dL    RDW 15.4 (H) 11.6 - 14.5 %    PLATELET 405 721 - 954 K/uL    MPV 11.4 9.2 - 11.8 FL    NEUTROPHILS 77 (H) 40 - 73 %    LYMPHOCYTES 12 (L) 21 - 52 %    MONOCYTES 10 3 - 10 %    EOSINOPHILS 1 0 - 5 %    BASOPHILS 0 0 - 2 %    ABS. NEUTROPHILS 8.2 (H) 1.8 - 8.0 K/UL    ABS. LYMPHOCYTES 1.2 0.9 - 3.6 K/UL    ABS. MONOCYTES 1.1 0.05 - 1.2 K/UL    ABS. EOSINOPHILS 0.1 0.0 - 0.4 K/UL    ABS. BASOPHILS 0.0 0.0 - 0.1 K/UL    DF AUTOMATED     METABOLIC PANEL, BASIC    Collection Time: 02/12/19  5:15 AM   Result Value Ref Range    Sodium 140 136 - 145 mmol/L    Potassium 4.2 3.5 - 5.5 mmol/L    Chloride 108 100 - 108 mmol/L    CO2 20 (L) 21 - 32 mmol/L    Anion gap 12 3.0 - 18 mmol/L    Glucose 99 74 - 99 mg/dL    BUN 58 (H) 7.0 - 18 MG/DL    Creatinine 2.90 (H) 0.6 - 1.3 MG/DL    BUN/Creatinine ratio 20 12 - 20      GFR est AA 25 (L) >60 ml/min/1.73m2    GFR est non-AA 20 (L) >60 ml/min/1.73m2    Calcium 8.1 (L) 8.5 - 10.1 MG/DL   CARDIAC PANEL,(CK, CKMB & TROPONIN)    Collection Time: 02/12/19  5:15 AM   Result Value Ref Range    CK 72 39 - 308 U/L    CK - MB 3.5 <3.6 ng/ml    CK-MB Index 4.9 (H) 0.0 - 4.0 %    Troponin-I, QT 0.05 (H) 0.0 - 0.045 NG/ML   ECHO ADULT COMPLETE    Collection Time: 02/12/19  8:48 AM   Result Value Ref Range    LA Volume 38.49 18 - 58 mL    Right Atrial Area 4C 16.56 cm2    Ao Root D 3.37 cm   PTT    Collection Time: 02/12/19 10:06 AM   Result Value Ref Range    aPTT 52.2 (H) 23.0 - 36.4 SEC           No results for input(s): FIO2I, IFO2, HCO3I, IHCO3, HCOPOC, PCO2I, PCOPOC, IPHI, PHI, PHPOC, PO2I, PO2POC in the last 72 hours.     No lab exists for component: IPOC2    All Micro Results     None          ECHO - pending      Imaging:  [x]I have personally reviewed the patients chest radiographs images and report     Results from Hospital Encounter encounter on 02/11/19   XR CHEST PORT    Narrative Portable Chest      History: Shortness of breath, dyspnea on exertion    Comparison: AP chest 03/06/2015    Portable view of the chest demonstrates stable cardiomediastinal silhouette. Interstitium is slightly indistinct and thickened suggesting vascular  congestion. Right costophrenic angle is now blunted. Left costophrenic angle may  be slightly blunted as well. No pneumothorax is seen. Cardiac monitoring leads  are present. Degenerative changes are seen involving the shoulders. Degenerative  changes are also present in the spine. Impression IMPRESSION:    Findings suggesting vascular congestion with small right pleural effusion. Additional very small left pleural effusion may be present as well. IMPRESSION:   · Atrial fibrillation with RVR  · Acute diastolic CHF  · Wheezing - former smoker  · CKD stage 3  · HTN   · Chronic anemia       RECOMMENDATIONS:   · Pulm: stable respirations; avoid beta agonists nebs due to Afib. Budesonide nebs bid and prn ipratrop nebs. CXR consistent with mild CHF findings.    · Cardiac: metoprolol oral for rate control; not needing cardizem drip; on heparin drip; no significant increase in trops; Cardiology on case  · ID: no active issues; check UA  · Renal: watch UOP and creatinine  · GI: oral diet; PPI  · Neuro: stable  · Hem: watch Hb and Plts  · Endo: watch BG  · Nutrition:  Oral diet  · Proph:  DVt and GI proph - heparin and protonix  · D/w patient and updated  · Transfer to tele once Afib remains controlled  Will defer respective systems problem management to primary and other consultant and follow patient in ICU with primary and other medical team  Further recommendations will be based on the patient's response to recommended treatment and results of the investigation ordered. Quality Care: PPI, DVT prophylaxis, HOB elevated, Infection control all reviewed and addressed.   PAIN AND SEDATION: none   · Skin/Wound: no active issues  · Nutrition: oral diet as tolerated  · Prophylaxis: DVT and GI Prophylaxis reviewed  · Restraints: none  · PT/OT eval and treat: as needed  · Lines/Tubes: PIVs  ADVANCE DIRECTIVE: Full Code    · Thank you for the consult      High complexity decision making was performed in this consultation and evaluation of this patient who is at high risk for decompensation with multiple organ involvement         Lang Hawkins MD

## 2019-02-12 NOTE — PROGRESS NOTES
0730 hrs Bedside and Verbal shift change report given to Trang Lafleur (oncoming nurse) by Denzel Peralta (offgoing nurse). Report included the following information SBAR, Kardex, ED Summary, Recent Results, Med Rec Status and Cardiac Rhythm A Fib.  
0800 hrs Assessment Completed. PT alert and oriented X 4. At room air sat at 98%. Afib on the monitor. Denies any pain at this time. see flow sheet for full assessment. 1200 hrs Reassessment completed with no change. 1600 hrs Reassessment completed. 1920 hrs Bedside and Verbal shift change report given to RAMA Abbott RN (oncoming nurse) by Trang Lafleur (offgoing nurse). Report included the following information SBAR, Kardex, ED Summary, Intake/Output, Recent Results, Med Rec Status and Cardiac Rhythm A fib. All question answered

## 2019-02-12 NOTE — ED NOTES
Bedside report received from Wendy Henderson RN. Dual verified Heparin and Cardizem IV drips. Updated family on progress at bedside.

## 2019-02-12 NOTE — DIABETES MGMT
GLYCEMIC CONTROL & NUTRITION: 
 
 
- Discussed in rounds and chart reviewed, no known h/o DM 
- No glycemic control needs identified at this time. Recent Glucose Results:  
Lab Results Component Value Date/Time  GLU 99 02/12/2019 05:15 AM  
  (H) 02/11/2019 04:20 PM

## 2019-02-12 NOTE — PROGRESS NOTES
2129- Received from ER, assessment completed per flow sheet. Alert, pleasant, cooperative, denies pain or SOB. Heparin drip infusing via IV pump without difficulty. 0000- Reassessment completed and without change. 0400- Reassessment completed and without change.

## 2019-02-12 NOTE — PROGRESS NOTES
Problem: Falls - Risk of 
Goal: *Absence of Falls Document Thomas Shearer Fall Risk and appropriate interventions in the flowsheet. Outcome: Progressing Towards Goal 
Fall Risk Interventions: 
  
 
  
 
Medication Interventions: Evaluate medications/consider consulting pharmacy, Patient to call before getting OOB Elimination Interventions: Patient to call for help with toileting needs, Toileting schedule/hourly rounds, Call light in reach

## 2019-02-12 NOTE — PROGRESS NOTES
Cardiology Progress Note Patient: Gopal De Los Santos        Sex: male          DOA: 2/11/2019 YOB: 1924      Age:  80 y.o.        LOS:  LOS: 1 day Assessment/Plan Principal Problem: 
  New onset atrial fibrillation ([facility] [address] (2/11/2019) Active Problems: 
  CHF (congestive heart failure) ([facility] [address] (2/11/2019) CKD (chronic kidney disease) stage 3, GFR 30-59 ml/min ([facility]) (2/11/2019) Hypertension () Plan: Afib with RVR - improvedadd po dose of Cardizem and continue with metoprolol and heparin Diastolic heart failure- lasix Continue with heparin- stress test tomorrow if normal then bridge with warfarin Discussed with patient Subjective:  
 cc: Afib with RVR Diastolic heart failure REVIEW OF SYSTEMS:  
 
General: No fevers or chills. Cardiovascular: No chest pain or pressure. No palpitations. No ankle swelling Pulmonary: No SOB, orthopnea, PND Gastrointestinal: No nausea, vomiting or diarrhea Objective:  
  
Visit Vitals BP 98/73 (BP 1 Location: Left arm, BP Patient Position: At rest) Pulse (!) 102 Temp 97.8 °F (36.6 °C) Resp 15 Ht 5' 6\" (1.676 m) Wt 68.9 kg (152 lb) SpO2 98% BMI 24.53 kg/m² Body mass index is 24.53 kg/m². Physical Exam: 
General Appearance: Comfortable, not using accessory muscles of respiration. NECK: No JVD, no thyroidomeglay. LUNGS: Clear bilaterally. HEART: S1 variable +S2 audible, ABD: Non-tender, BS Audible EXT: ++ edema, and no cysnosis. VASCULAR EXAM: Pulses are intact. PSYCHIATRIC EXAM: Mood is appropriate. Medication: 
Current Facility-Administered Medications Medication Dose Route Frequency  budesonide (PULMICORT) 500 mcg/2 ml nebulizer suspension  500 mcg Nebulization BID RT  
 ipratropium (ATROVENT) 0.02 % nebulizer solution 0.5 mg  0.5 mg Nebulization Q4H PRN  
 dilTIAZem CD (CARDIZEM CD) capsule 120 mg  120 mg Oral DAILY  heparin 25,000 units in D5W 250 ml infusion  12-25 Units/kg/hr IntraVENous TITRATE  dilTIAZem (CARDIZEM) 100 mg in 0.9% sodium chloride (MBP/ADV) 100 mL infusion  0-15 mg/hr IntraVENous CONTINUOUS  
 terazosin (HYTRIN) capsule 10 mg  10 mg Oral QHS  furosemide (LASIX) injection 40 mg  40 mg IntraVENous BID  pantoprazole (PROTONIX) tablet 40 mg  40 mg Oral DAILY  metoprolol succinate (TOPROL-XL) XL tablet 25 mg  25 mg Oral DAILY Lab/Data Reviewed: 
Procedures/imaging: see electronic medical records for all procedures/Xrays  
and details which were not copied into this note but were reviewed prior to creation of Plan 
  
 
All lab results for the last 24 hours reviewed. Recent Labs  
  02/12/19 
0515 02/11/19 
1620 WBC 10.7 12.8 HGB 9.6* 10.2* HCT 31.5* 33.1*  
 340 Recent Labs  
  02/12/19 
0515 02/11/19 
1620  138  
K 4.2 4.6  108 CO2 20* 22  
GLU 99 101* BUN 58* 56* CREA 2.90* 2.88* CA 8.1* 8.4* Signed By: Shala Quiñones MD   
 February 12, 2019

## 2019-02-12 NOTE — ED NOTES
TRANSFER - OUT REPORT: 
 
Verbal report given to Nancy Kirkland RN on Shea Law  being transferred to ICU for routine progression of care Report consisted of patients Situation, Background, Assessment and  
Recommendations(SBAR). Information from the following report(s) SBAR, ED Summary, [facility] and Recent Results was reviewed with the receiving nurse. Lines:  
Peripheral IV 02/11/19 Left Antecubital (Active) Site Assessment Clean, dry, & intact 2/11/2019  4:33 PM  
Phlebitis Assessment 0 2/11/2019  4:33 PM  
Infiltration Assessment 0 2/11/2019  4:33 PM  
Dressing Status Clean, dry, & intact 2/11/2019  4:33 PM  
Dressing Type Transparent 2/11/2019  4:33 PM  
Hub Color/Line Status Green;Flushed;Patent 2/11/2019  4:33 PM  
   
Peripheral IV 02/11/19 Right Forearm (Active) Site Assessment Clean, dry, & intact 2/11/2019  6:16 PM  
Phlebitis Assessment 0 2/11/2019  6:16 PM  
Infiltration Assessment 0 2/11/2019  6:16 PM  
Dressing Status Clean, dry, & intact; Occlusive 2/11/2019  6:16 PM  
Dressing Type Transparent 2/11/2019  6:16 PM  
Hub Color/Line Status Pink 2/11/2019  6:16 PM  
Alcohol Cap Used Yes 2/11/2019  6:16 PM  
  
 
Opportunity for questions and clarification was provided. Patient transported with: 
 Monitor Registered Nurse

## 2019-02-12 NOTE — PROGRESS NOTES
Reason for Admission:   c/omSOB RRAT Score:  21 Resources/supports as identified by patient/family:   TBD Top Challenges facing patient (as identified by patient/family and CM): Finances/Medication cost?       
           
Transportation? Support system or lack thereof? Living arrangements? Self-care/ADLs/Cognition? Current Advanced Directive/Advance Care Plan:  Not on chart, will suggest palliative consult for decision making Plan for utilizing home health:  TBD Likelihood of readmission: no 
              
Transition of Care Plan:   Chart reviewed noted from ED note pt arrived via ems  With dyspnea and new onset of A-Fib, pt recently went to Pcp for leg swelling and SOB yesterday and was advised to come to ED for evaluation hx of CHF,CKD,Htn, cm will cont to review and remain available for d/c planning. Care Management Interventions PCP Verified by CM: Yes 
Palliative Care Criteria Met (RRAT>21 & CHF Dx)?: Yes 
Palliative Consult Recommended?: Yes Current Support Network: Lives with Spouse

## 2019-02-12 NOTE — H&P
History & Physical 
Patient: Gopal De Los Santos MRN: [id number]  CSN: [id number] YOB: 1924  Age: 80 y.o. Sex: male DOA: 2/11/2019 Primary Care Provider:  Letty Jeffery MD 
 
 
Assessment/Plan Patient Active Problem List  
Diagnosis Code  CHF (congestive heart failure) ([facility]) I50.9  New onset atrial fibrillation ([facility]) I48.91  
 CKD (chronic kidney disease) stage 3, GFR 30-59 ml/min ([facility]) N18.3  Hypertension I10 CRITICAL CARE PLAN Resp - No acute respiratory issues, will oxygen by NC as needed. ID - No evidence of infection CVS - Monitor HD. A-fib with RVR - started on cardizem drip, heparin drip. check TSH. CHF - last echo 2015 with EF of 55% Continue with lasix. Follow echo. Cardiology consulted Heme/onc - Follow H&H, plts. Renal - Trend BUN, Cr, follow I/O. Check and replace Mg, K, phos. CKD - stage 3, monitor in the setting of diuresis. Endocrine - Check TSH 
 
GI - cardiac diet. Prophylaxis - DVT: heparin, GI: protonix 40 minutes of critical care time spent in the direct evaluation and treatment of this high risk patient. The reason for providing this level of medical care for this critically ill patient was due a critical illness that impaired one or more vital organ systems such that there was a high probability of imminent or life threatening deterioration in the patients condition. This care involved high complexity decision making to assess, manipulate, and support vital system functions, to treat this degreee vital organ system failure and to prevent further life threatening deterioration of the patients condition. Estimated length of stay : 2-3 days CC: edema HPI:  
 
Gopal De Los Santos is a 80 y.o. male who has past history of HTN, CKD is sent to ER from PCP office for A-fib with RVR. Patient is hard of hearing.  He reports that he has been having leg swelling for the past one week and also SOB for the past 2 days. He has been getter progressive DAVIS. He denies any chest pain, fever/chills, N/V. He went to see his PCP and was evaluated by Dr. Ashley Hooks. He was noted to be in A-fib with RVR. He was sent to ER from the office. In ER his HR above 140. He was given cardizem with no improvement in his HR. He was started on cardizem drip and heparin per cardiology recommendation. Past Medical History:  
Diagnosis Date  A-fib ([facility] [address]  CHF (congestive heart failure) ([facility] [address] 2/11/2019  Chronic kidney disease Cr 2.2 which is chronic - at least since 2011  Hypertension Past Surgical History:  
Procedure Laterality Date  HX ORTHOPAEDIC Back surgery History reviewed. No pertinent family history. Social History Socioeconomic History  Marital status:  Spouse name: Not on file  Number of children: Not on file  Years of education: Not on file  Highest education level: Not on file Tobacco Use  Smoking status: Former Smoker  Smokeless tobacco: Never Used Substance and Sexual Activity  Alcohol use: No  
 Drug use: No  
 
 
Prior to Admission medications Medication Sig Start Date End Date Taking? Authorizing Provider  
terazosin (HYTRIN) 10 mg capsule Take 10 mg by mouth nightly. Yes Other, MD Mauro  
calcium-vitamin D (OYSTER SHELL) 500 mg(1,250mg) -200 unit per tablet Take 1 Tab by mouth two (2) times daily (with meals). Yes Other, MD Mauro  
cyanocobalamin (VITAMIN B-12) 500 mcg tablet Take 500 mcg by mouth daily. Yes Other, MD Mauro  
 
 
No Known Allergies Review of Systems Gen: No fever, chills, malaise, weight loss/gain. Heent: No headache, rhinorrhea, epistaxis, ear pain, hearing loss, sinus pain, neck pain/stiffness, sore throat. Heart: see above Resp: see above. GI: No nausea, vomiting, diarrhea, constipation, melena or hematochezia. : No urinary obstruction, dysuria or hematuria. Derm: No rash, new skin lesion or pruritis. Musc/skeletal: no bone or joint complains. Vasc: No  cyanosis or claudication. Endo: No heat/cold intolerance, no polyuria,polydipsia or polyphagia. Neuro: No unilateral weakness, numbness, tingling. No seizures. Heme: No easy bruising or bleeding. Physical Exam:  
 
Physical Exam: 
Visit Vitals /66 Pulse 96 Temp 98 °F (36.7 °C) Resp 21 Ht 5' 6\" (1.676 m) Wt 68.9 kg (152 lb) SpO2 98% BMI 24.53 kg/m² O2 Device: Room air Temp (24hrs), Av °F (36.7 °C), Min:98 °F (36.7 °C), Max:98 °F (36.7 °C) No intake/output data recorded. No intake/output data recorded. General:  Awake, cooperative, no distress. Head:  Normocephalic, without obvious abnormality, atraumatic. Eyes:  Conjunctivae/corneas clear, sclera anicteric, PERRL, EOMs intact. Nose: Nares normal. No drainage or sinus tenderness. Throat: Lips, mucosa, and tongue normal.   
Neck: Supple, symmetrical, trachea midline, no adenopathy. Lungs:   Clear to auscultation bilaterally. Heart:   S1, S2, Irregular no murmur, click, rub or gallop. Abdomen: Soft, non-tender. Bowel sounds normal. No masses,  No organomegaly. Extremities: Extremities normal, atraumatic, no cyanosis. 2+ edema. Capillary refill normal.  
Pulses: 2+ and symmetric all extremities. Skin: Skin color pink, turgor normal. No rashes or lesions. LE ext skin changes with chronic venous insufficiency. Neurologic: CNII-XII intact. No focal motor or sensory deficit. Labs Reviewed: 
 
CMP:  
Lab Results Component Value Date/Time   2019 04:20 PM  
 K 4.6 2019 04:20 PM  
  2019 04:20 PM  
 CO2 22 2019 04:20 PM  
 AGAP 8 2019 04:20 PM  
  (H) 2019 04:20 PM  
 BUN 56 (H) 2019 04:20 PM  
 CREA 2.88 (H) 2019 04:20 PM  
 GFRAA 25 (L) 2019 04:20 PM  
 GFRNA 21 (L) 2019 04:20 PM  
 CA 8.4 (L) 02/11/2019 04:20 PM  
 MG 2.7 (H) 02/11/2019 04:20 PM  
 ALB 3.9 02/11/2019 04:20 PM  
 TP 7.1 02/11/2019 04:20 PM  
 GLOB 3.2 02/11/2019 04:20 PM  
 AGRAT 1.2 02/11/2019 04:20 PM  
 SGOT 13 (L) 02/11/2019 04:20 PM  
 ALT 22 02/11/2019 04:20 PM  
 
CBC:  
Lab Results Component Value Date/Time WBC 12.8 02/11/2019 04:20 PM  
 HGB 10.2 (L) 02/11/2019 04:20 PM  
 HCT 33.1 (L) 02/11/2019 04:20 PM  
  02/11/2019 04:20 PM  
 
All Cardiac Markers in the last 24 hours:  
Lab Results Component Value Date/Time  02/11/2019 04:20 PM  
 CKMB 5.3 (H) 02/11/2019 04:20 PM  
 CKND1 4.8 (H) 02/11/2019 04:20 PM  
 TROIQ 0.05 (H) 02/11/2019 04:20 PM  
 
 
 
Procedures/imaging: see electronic medical records for all procedures/Xrays and details which were not copied into this note but were reviewed prior to creation of Plan CC: Gayla Du MD

## 2019-02-13 ENCOUNTER — APPOINTMENT (OUTPATIENT)
Dept: GENERAL RADIOLOGY | Age: 84
DRG: 291 | End: 2019-02-13
Attending: INTERNAL MEDICINE
Payer: MEDICARE

## 2019-02-13 ENCOUNTER — APPOINTMENT (OUTPATIENT)
Dept: NUCLEAR MEDICINE | Age: 84
DRG: 291 | End: 2019-02-13
Attending: INTERNAL MEDICINE
Payer: MEDICARE

## 2019-02-13 LAB
ANION GAP SERPL CALC-SCNC: 12 MMOL/L (ref 3–18)
APTT PPP: 103.7 SEC (ref 23–36.4)
APTT PPP: 108.5 SEC (ref 23–36.4)
APTT PPP: 49.1 SEC (ref 23–36.4)
APTT PPP: 67.2 SEC (ref 23–36.4)
BASOPHILS # BLD: 0 K/UL (ref 0–0.1)
BASOPHILS NFR BLD: 0 % (ref 0–2)
BUN SERPL-MCNC: 58 MG/DL (ref 7–18)
BUN/CREAT SERPL: 20 (ref 12–20)
CALCIUM SERPL-MCNC: 8.3 MG/DL (ref 8.5–10.1)
CHLORIDE SERPL-SCNC: 108 MMOL/L (ref 100–108)
CO2 SERPL-SCNC: 23 MMOL/L (ref 21–32)
CREAT SERPL-MCNC: 2.97 MG/DL (ref 0.6–1.3)
DIFFERENTIAL METHOD BLD: ABNORMAL
EOSINOPHIL # BLD: 0.2 K/UL (ref 0–0.4)
EOSINOPHIL NFR BLD: 2 % (ref 0–5)
ERYTHROCYTE [DISTWIDTH] IN BLOOD BY AUTOMATED COUNT: 15.4 % (ref 11.6–14.5)
GLUCOSE SERPL-MCNC: 87 MG/DL (ref 74–99)
HCT VFR BLD AUTO: 31.7 % (ref 36–48)
HGB BLD-MCNC: 9.9 G/DL (ref 13–16)
LYMPHOCYTES # BLD: 1.1 K/UL (ref 0.9–3.6)
LYMPHOCYTES NFR BLD: 11 % (ref 21–52)
MCH RBC QN AUTO: 27.2 PG (ref 24–34)
MCHC RBC AUTO-ENTMCNC: 31.2 G/DL (ref 31–37)
MCV RBC AUTO: 87.1 FL (ref 74–97)
MONOCYTES # BLD: 1.1 K/UL (ref 0.05–1.2)
MONOCYTES NFR BLD: 12 % (ref 3–10)
NEUTS SEG # BLD: 7.4 K/UL (ref 1.8–8)
NEUTS SEG NFR BLD: 75 % (ref 40–73)
PLATELET # BLD AUTO: 265 K/UL (ref 135–420)
PMV BLD AUTO: 10.8 FL (ref 9.2–11.8)
POTASSIUM SERPL-SCNC: 3.7 MMOL/L (ref 3.5–5.5)
RBC # BLD AUTO: 3.64 M/UL (ref 4.7–5.5)
SODIUM SERPL-SCNC: 143 MMOL/L (ref 136–145)
WBC # BLD AUTO: 9.8 K/UL (ref 4.6–13.2)

## 2019-02-13 PROCEDURE — 85730 THROMBOPLASTIN TIME PARTIAL: CPT

## 2019-02-13 PROCEDURE — 71045 X-RAY EXAM CHEST 1 VIEW: CPT

## 2019-02-13 PROCEDURE — 85025 COMPLETE CBC W/AUTO DIFF WBC: CPT

## 2019-02-13 PROCEDURE — 74011250636 HC RX REV CODE- 250/636: Performed by: EMERGENCY MEDICINE

## 2019-02-13 PROCEDURE — 94640 AIRWAY INHALATION TREATMENT: CPT

## 2019-02-13 PROCEDURE — 87086 URINE CULTURE/COLONY COUNT: CPT

## 2019-02-13 PROCEDURE — 74011000250 HC RX REV CODE- 250: Performed by: INTERNAL MEDICINE

## 2019-02-13 PROCEDURE — 74011250636 HC RX REV CODE- 250/636: Performed by: INTERNAL MEDICINE

## 2019-02-13 PROCEDURE — 80048 BASIC METABOLIC PNL TOTAL CA: CPT

## 2019-02-13 PROCEDURE — 74011250637 HC RX REV CODE- 250/637: Performed by: HOSPITALIST

## 2019-02-13 PROCEDURE — 36415 COLL VENOUS BLD VENIPUNCTURE: CPT

## 2019-02-13 PROCEDURE — 65610000006 HC RM INTENSIVE CARE

## 2019-02-13 PROCEDURE — 74011250636 HC RX REV CODE- 250/636: Performed by: HOSPITALIST

## 2019-02-13 PROCEDURE — 74011250637 HC RX REV CODE- 250/637: Performed by: INTERNAL MEDICINE

## 2019-02-13 RX ORDER — METOPROLOL TARTRATE 5 MG/5ML
5 INJECTION INTRAVENOUS ONCE
Status: COMPLETED | OUTPATIENT
Start: 2019-02-13 | End: 2019-02-13

## 2019-02-13 RX ORDER — HEPARIN SODIUM 1000 [USP'U]/ML
3000 INJECTION, SOLUTION INTRAVENOUS; SUBCUTANEOUS ONCE
Status: COMPLETED | OUTPATIENT
Start: 2019-02-13 | End: 2019-02-13

## 2019-02-13 RX ORDER — METOPROLOL SUCCINATE 25 MG/1
25 TABLET, EXTENDED RELEASE ORAL ONCE
Status: COMPLETED | OUTPATIENT
Start: 2019-02-13 | End: 2019-02-13

## 2019-02-13 RX ORDER — HEPARIN SODIUM 1000 [USP'U]/ML
3000 INJECTION, SOLUTION INTRAVENOUS; SUBCUTANEOUS
Status: COMPLETED | OUTPATIENT
Start: 2019-02-13 | End: 2019-02-13

## 2019-02-13 RX ORDER — METOPROLOL SUCCINATE 50 MG/1
50 TABLET, EXTENDED RELEASE ORAL DAILY
Status: DISCONTINUED | OUTPATIENT
Start: 2019-02-14 | End: 2019-02-14

## 2019-02-13 RX ORDER — DIGOXIN 0.25 MG/ML
250 INJECTION INTRAMUSCULAR; INTRAVENOUS
Status: COMPLETED | OUTPATIENT
Start: 2019-02-13 | End: 2019-02-13

## 2019-02-13 RX ADMIN — TERAZOSIN HYDROCHLORIDE 10 MG: 5 CAPSULE ORAL at 21:29

## 2019-02-13 RX ADMIN — HEPARIN SODIUM 3000 UNITS: 1000 INJECTION INTRAVENOUS; SUBCUTANEOUS at 00:32

## 2019-02-13 RX ADMIN — HEPARIN SODIUM 3000 UNITS: 1000 INJECTION INTRAVENOUS; SUBCUTANEOUS at 16:38

## 2019-02-13 RX ADMIN — METOPROLOL TARTRATE 5 MG: 1 INJECTION, SOLUTION INTRAVENOUS at 10:21

## 2019-02-13 RX ADMIN — METOPROLOL SUCCINATE 25 MG: 25 TABLET, EXTENDED RELEASE ORAL at 10:22

## 2019-02-13 RX ADMIN — BUDESONIDE 500 MCG: 0.5 INHALANT RESPIRATORY (INHALATION) at 07:05

## 2019-02-13 RX ADMIN — PANTOPRAZOLE SODIUM 40 MG: 40 TABLET, DELAYED RELEASE ORAL at 08:17

## 2019-02-13 RX ADMIN — FUROSEMIDE 40 MG: 10 INJECTION, SOLUTION INTRAMUSCULAR; INTRAVENOUS at 08:17

## 2019-02-13 RX ADMIN — BUDESONIDE 500 MCG: 0.5 INHALANT RESPIRATORY (INHALATION) at 19:47

## 2019-02-13 RX ADMIN — FUROSEMIDE 40 MG: 10 INJECTION, SOLUTION INTRAMUSCULAR; INTRAVENOUS at 21:31

## 2019-02-13 RX ADMIN — HEPARIN SODIUM AND DEXTROSE 8 UNITS/KG/HR: 10000; 5 INJECTION INTRAVENOUS at 08:16

## 2019-02-13 RX ADMIN — CEFTRIAXONE 1 G: 1 INJECTION, POWDER, FOR SOLUTION INTRAMUSCULAR; INTRAVENOUS at 13:18

## 2019-02-13 RX ADMIN — METOPROLOL SUCCINATE 25 MG: 25 TABLET, EXTENDED RELEASE ORAL at 08:17

## 2019-02-13 RX ADMIN — DIGOXIN 250 MCG: 0.25 INJECTION INTRAMUSCULAR; INTRAVENOUS at 14:29

## 2019-02-13 RX ADMIN — DILTIAZEM HYDROCHLORIDE 120 MG: 120 CAPSULE, COATED, EXTENDED RELEASE ORAL at 18:11

## 2019-02-13 NOTE — PROGRESS NOTES
Cardiology Progress Note Patient: Sarah Diallo        Sex: male          DOA: 2/11/2019 YOB: 1924      Age:  80 y.o.        LOS:  LOS: 2 days Assessment/Plan Principal Problem: 
  New onset atrial fibrillation ([facility] [address] (2/11/2019) Active Problems: 
  CHF (congestive heart failure) ([facility] [address] (2/11/2019) CKD (chronic kidney disease) stage 3, GFR 30-59 ml/min ([facility]) (2/11/2019) Hypertension () Plan: 
Increase metoprolol succinate from 25 mg to 50 mg Dig  microgram X1 Give one dose of lopressor 5 mg IV- already given Stress test in AM 
 
 
         
 
Subjective:  
 cc: 
afib with RVR CHF REVIEW OF SYSTEMS:  
 
General: No fevers or chills. Cardiovascular: No chest pain or pressure. No palpitations. improving ankle swelling Pulmonary: improving SOB, orthopnea, PND Gastrointestinal: No nausea, vomiting or diarrhea Objective:  
  
Visit Vitals /78 Pulse (!) 116 Temp 98 °F (36.7 °C) Resp 16 Ht 5' 6\" (1.676 m) Wt 68.9 kg (152 lb) SpO2 90% BMI 24.53 kg/m² Body mass index is 24.53 kg/m². Physical Exam: 
General Appearance: Comfortable, not using accessory muscles of respiration. NECK: No JVD, no thyroidomeglay. LUNGS: Clear bilaterally. HEART: S1 variable+S2 audible, ABD: Non-tender, BS Audible EXT: 1+ edema, and no cysnosis. VASCULAR EXAM: Pulses are intact. PSYCHIATRIC EXAM: Mood is appropriate. Medication: 
Current Facility-Administered Medications Medication Dose Route Frequency  [START ON 2/14/2019] metoprolol succinate (TOPROL-XL) XL tablet 50 mg  50 mg Oral DAILY  cefTRIAXone (ROCEPHIN) 1 g in sterile water (preservative free) 10 mL IV syringe  1 g IntraVENous Q24H  
 digoxin (LANOXIN) injection 250 mcg  250 mcg IntraVENous NOW  
 budesonide (PULMICORT) 500 mcg/2 ml nebulizer suspension  500 mcg Nebulization BID RT  
  ipratropium (ATROVENT) 0.02 % nebulizer solution 0.5 mg  0.5 mg Nebulization Q4H PRN  
 dilTIAZem CD (CARDIZEM CD) capsule 120 mg  120 mg Oral DAILY  heparin 25,000 units in D5W 250 ml infusion  12-25 Units/kg/hr IntraVENous TITRATE  terazosin (HYTRIN) capsule 10 mg  10 mg Oral QHS  furosemide (LASIX) injection 40 mg  40 mg IntraVENous BID  pantoprazole (PROTONIX) tablet 40 mg  40 mg Oral DAILY Lab/Data Reviewed: 
Procedures/imaging: see electronic medical records for all procedures/Xrays  
and details which were not copied into this note but were reviewed prior to creation of Plan 
  
 
All lab results for the last 24 hours reviewed. Recent Labs  
  02/13/19 0607 02/12/19 0515 02/11/19 
1620 WBC 9.8 10.7 12.8 HGB 9.9* 9.6* 10.2* HCT 31.7* 31.5* 33.1*  
 281 340 Recent Labs  
  02/13/19 0607 02/12/19 0515 02/11/19 
1620  140 138  
K 3.7 4.2 4.6  108 108 CO2 23 20* 22 GLU 87 99 101* BUN 58* 58* 56* CREA 2.97* 2.90* 2.88* CA 8.3* 8.1* 8.4* Signed By: Clarisse Jones MD   
 February 13, 2019

## 2019-02-13 NOTE — DIABETES MGMT
GLYCEMIC CONTROL PROGRESS NOTE: 
 
- chart reviewed, no known h/o DM 
- no identified glycemic control needs at this time Recent Glucose Results:  
Lab Results Component Value Date/Time GLU 87 02/13/2019 06:07 AM  
 
Junious All MS, RN, CDE Glycemic Control Team 
[phone] Pager [phone] (M-TH 8:00-4:30P) *After Hours pager [phone]

## 2019-02-13 NOTE — PROGRESS NOTES
[facility] Pulmonary, Critical Care, and Sleep Medicine Pulm/CC Note Name: Areli Gaspar MRN: [id number] : 11/3/1924 Hospital: [facility] [place] Date: 2019  Room: [facility] Subjective: Afib with RVR - admitted to icu on 19. Patient is a 80 y.o. male with hx of HTN. He went to PCP office yesterday because of swelling in his legs and SOB. He was found to have fast Afib and [place] [facility] [place] [facility]. 
 
19 Patient awake, alert Afib rate controlled No CP or SOB No cough or wheezing HR 110s; BP stable. He is on heparin drip. Not needing cardizem drip. UOP 2.6 L yesterday. Tele - Afib with rate 120s SH - stopped smoking at age 58; hx of 40 yrs smoking at < 1 ppd; stopped alcohol in 46s. Past Medical History:  
Diagnosis Date  A-fib ([facility] [address]  CHF (congestive heart failure) ([facility] [address] 2019  Chronic kidney disease Cr 2.2 which is chronic - at least since   Hypertension Past Surgical History:  
Procedure Laterality Date  HX ORTHOPAEDIC Back surgery Prior to Admission medications Medication Sig Start Date End Date Taking? Authorizing Provider  
terazosin (HYTRIN) 10 mg capsule Take 10 mg by mouth nightly. Yes Catrachita, MD Mauro  
calcium-vitamin D (OYSTER SHELL) 500 mg(1,250mg) -200 unit per tablet Take 1 Tab by mouth two (2) times daily (with meals). Yes Catrachita, MD Mauro  
cyanocobalamin (VITAMIN B-12) 500 mcg tablet Take 500 mcg by mouth daily. Yes Catrachita, MD aMuro  
 
No Known Allergies Social History Tobacco Use  Smoking status: Former Smoker  Smokeless tobacco: Never Used Substance Use Topics  Alcohol use: No  
  
History reviewed. No pertinent family history. Current Facility-Administered Medications Medication Dose Route Frequency  [START ON 2019] metoprolol succinate (TOPROL-XL) XL tablet 50 mg  50 mg Oral DAILY  budesonide (PULMICORT) 500 mcg/2 ml nebulizer suspension  500 mcg Nebulization BID RT  
 dilTIAZem CD (CARDIZEM CD) capsule 120 mg  120 mg Oral DAILY  heparin 25,000 units in D5W 250 ml infusion  12-25 Units/kg/hr IntraVENous TITRATE  dilTIAZem (CARDIZEM) 100 mg in 0.9% sodium chloride (MBP/ADV) 100 mL infusion  0-15 mg/hr IntraVENous CONTINUOUS  
 terazosin (HYTRIN) capsule 10 mg  10 mg Oral QHS  furosemide (LASIX) injection 40 mg  40 mg IntraVENous BID  pantoprazole (PROTONIX) tablet 40 mg  40 mg Oral DAILY Review of Systems: 
Ears, nose, mouth, throat, and face: no difficulty in swallowing Respiratory: no cough or SOB or hemoptysis Cardiovascular: no chest pain or palpitations, + bilateral leg edema Gastrointestinal: no abd pain, vomitting or diarrhea, no bleeding symptoms Genitourinary: No urinary symptoms Neurological: No focal weakness or seizures Behvioral/Psych: No anxiety or depression Constitutional: No fever or chills Objective: 
Vital Signs:   
Visit Vitals /78 Pulse (!) 116 Temp 97.6 °F (36.4 °C) Resp 16 Ht 5' 6\" (1.676 m) Wt 68.9 kg (152 lb) SpO2 90% BMI 24.53 kg/m² O2 Device: Room air Temp (24hrs), Av.7 °F (36.5 °C), Min:97.6 °F (36.4 °C), Max:98 °F (36.7 °C) Intake/Output:  
Last shift:      701 - 1900 In: 200 [P.O.:200] Out: 250 [Urine:250] Last 3 shifts: 1901 -  0700 In: 167.3 [I.V.:167.3] Out: 0000 [LKCLS:5572] Intake/Output Summary (Last 24 hours) at 2019 1056 Last data filed at 2019 1008 Gross per 24 hour Intake 311.73 ml Output 2350 ml Net -2038.27 ml Physical Exam:  
Comfortable; on room air; acyanotic; appears good than stated age HEENT: pupils not dilated, no scleral jaundice Neck: No adenopathy or thyroid swelling CVS: S1S2 no murmurs; JVD not elevated RS: Mod air entry bilaterally, no wheezes; no crackles; normal respirations Abd: soft, non tender, no abd distension Neuro: non focal, awake, alert Extrm: mild bilateral pitting leg edema Skin: no rash Lymphatic: no cervical or supraclavicular adenopathy Psych: normal mood Data review:  
 
Recent Results (from the past 12 hour(s)) CBC WITH AUTOMATED DIFF Collection Time: 02/13/19  6:07 AM  
Result Value Ref Range WBC 9.8 4.6 - 13.2 K/uL  
 RBC 3.64 (L) 4.70 - 5.50 M/uL HGB 9.9 (L) 13.0 - 16.0 g/dL HCT 31.7 (L) 36.0 - 48.0 % MCV 87.1 74.0 - 97.0 FL  
 MCH 27.2 24.0 - 34.0 PG  
 MCHC 31.2 31.0 - 37.0 g/dL  
 RDW 15.4 (H) 11.6 - 14.5 % PLATELET 427 472 - 006 K/uL MPV 10.8 9.2 - 11.8 FL  
 NEUTROPHILS 75 (H) 40 - 73 % LYMPHOCYTES 11 (L) 21 - 52 % MONOCYTES 12 (H) 3 - 10 % EOSINOPHILS 2 0 - 5 % BASOPHILS 0 0 - 2 %  
 ABS. NEUTROPHILS 7.4 1.8 - 8.0 K/UL  
 ABS. LYMPHOCYTES 1.1 0.9 - 3.6 K/UL  
 ABS. MONOCYTES 1.1 0.05 - 1.2 K/UL  
 ABS. EOSINOPHILS 0.2 0.0 - 0.4 K/UL  
 ABS. BASOPHILS 0.0 0.0 - 0.1 K/UL  
 DF AUTOMATED METABOLIC PANEL, BASIC Collection Time: 02/13/19  6:07 AM  
Result Value Ref Range Sodium 143 136 - 145 mmol/L Potassium 3.7 3.5 - 5.5 mmol/L Chloride 108 100 - 108 mmol/L  
 CO2 23 21 - 32 mmol/L Anion gap 12 3.0 - 18 mmol/L Glucose 87 74 - 99 mg/dL BUN 58 (H) 7.0 - 18 MG/DL Creatinine 2.97 (H) 0.6 - 1.3 MG/DL  
 BUN/Creatinine ratio 20 12 - 20 GFR est AA 24 (L) >60 ml/min/1.73m2 GFR est non-AA 20 (L) >60 ml/min/1.73m2 Calcium 8.3 (L) 8.5 - 10.1 MG/DL  
PTT Collection Time: 02/13/19  6:07 AM  
Result Value Ref Range aPTT 103.7 (H) 23.0 - 36.4 SEC  
PTT Collection Time: 02/13/19 10:02 AM  
Result Value Ref Range aPTT 67.2 (H) 23.0 - 36.4 SEC No results for input(s): FIO2I, IFO2, HCO3I, IHCO3, HCOPOC, PCO2I, PCOPOC, IPHI, PHI, PHPOC, PO2I, PO2POC in the last 72 hours. No lab exists for component: IPOC2 All Micro Results None ECHO · Estimated left ventricular ejection fraction is 61 - 65%. Left ventricular moderate concentric hypertrophy. E/E' ratio is 16.29. 
· Left atrial cavity size is moderately dilated. · Right atrial cavity size is moderately dilated. · Right ventricular global systolic function is borderline low. · Aortic valve leaflet calcification present. Mild to moderate aortic valve regurgitation is present. · Mitral valve thickening. Mild mitral annular calcification. Trace mitral valve regurgitation. · Moderate tricuspid valve regurgitation is present. Moderate pulmonary hypertension is present. · Moderately elevated central venous pressure (10-15 mmHg); IVC diameter is larger than 21 mm and collapses more than 50% with respiration. Imaging: 
[x]I have personally reviewed the patients chest radiographs images and report Xray 2/11 Results from [facility] Encounter encounter on 02/11/19 XR CHEST PORT Narrative Portable Chest   
 
History: Shortness of breath, dyspnea on exertion Comparison: AP chest 03/06/2015 Portable view of the chest demonstrates stable cardiomediastinal silhouette. Interstitium is slightly indistinct and thickened suggesting vascular 
congestion. Right costophrenic angle is now blunted. Left costophrenic angle may 
be slightly blunted as well. No pneumothorax is seen. Cardiac monitoring leads 
are present. Degenerative changes are seen involving the shoulders. Degenerative 
changes are also present in the spine. Impression IMPRESSION: 
 
Findings suggesting vascular congestion with small right pleural effusion. Additional very small left pleural effusion may be present as well. IMPRESSION:  
· Atrial fibrillation with RVR · Acute diastolic CHF · Mod AR · Wheezing - former smoker · CKD stage 3 
· HTN  
· Chronic anemia RECOMMENDATIONS:  
· Pulm: stable respirations; o2 sats improves with deep breathing; check CXR  
 · Avoid beta agonists nebs due to Afib. Budesonide nebs bid and prn ipratrop nebs. · Cardiac: metoprolol oral for rate control; not needing cardizem drip; on heparin drip; no significant increase in trops; Cardiology on case; EF normal; planned for cardiac stress test 
· ID: no active issues; UA trace LE, few bacteria; check urine cx; ceftriaxone 1 gm daily x 3 days · Renal: watch UOP; stable creatinine · GI: oral diet; PPI · Neuro: stable · Hem: watch Hb and Plts · Endo: watch BG 
· Nutrition:  Oral diet · Proph:  DVt and GI proph - heparin and protonix · D/w patient and updated · Afib remains controlled; ok for tele bed Will defer respective systems problem management to primary and other consultant Further recommendations will be based on the patient's response to recommended treatment and results of the investigation ordered. Quality Care: PPI, DVT prophylaxis, HOB elevated, Infection control all reviewed and addressed. PAIN AND SEDATION: none · Skin/Wound: no active issues · Nutrition: oral diet as tolerated · Prophylaxis: DVT and GI Prophylaxis reviewed · Restraints: none 
· PT/OT eval and treat: as needed · Lines/Tubes: PIVs 
ADVANCE DIRECTIVE: Full Code Mod complexity decision making was performed in this consultation and evaluation of this patient Rashida Palacios MD

## 2019-02-13 NOTE — PROGRESS NOTES
Hospitalist Progress Note Patient: Nayely Domínguez MRN: [id number]  CSN: [id number] YOB: 1924  Age: 80 y.o. Sex: male DOA: 2/11/2019 LOS:  LOS: 2 days Assessment/Plan Patient Active Problem List  
Diagnosis Code  CHF (congestive heart failure) ([facility]) I50.9  New onset atrial fibrillation ([facility]) I48.91  
 CKD (chronic kidney disease) stage 3, GFR 30-59 ml/min ([facility]) N18.3  Hypertension I10  
  
 
 
 
79 yo male admitted for A-fib with RVR, SOB. Patient comfortable, denies any complains. His HR is still elevated, off cardizem drip. No acute events overnight. CRITICAL CARE PLAN 
  
Resp - No acute respiratory issues, will oxygen by NC as needed.  
  
ID - No evidence of infection 
  
CVS - Monitor HD. A-fib with RVR -  off cardizem drip, on lopressor, heparin drip. TSH in normal range. CHF - acute on chronic diastolic Continue with lasix. CE flat Echo with EF of 61-65%, moderate concentric LV hypertrophy. Cardiology following. Plan for stress test tomorrow. 
  
Heme/onc - Follow H&H, plts. 
  
Renal - Trend BUN, Cr, follow I/O. Check and replace Mg, K, phos. CKD - stage 3, monitor in the setting of diuresis. 
  
Endocrine -   
TSH in normal range 
  
GI - cardiac diet. 
  
Prophylaxis - DVT: heparin, GI: protonix 
  
 
  
 
 
 
Disposition : 2-3 days Review of systems General: No fevers or chills. Cardiovascular: No chest pain or pressure. No palpitations. Pulmonary: No shortness of breath. Gastrointestinal: No nausea, vomiting. Physical Exam: 
General: Awake, cooperative, no acute distress   
HEENT: NC, Atraumatic. PERRLA, anicteric sclerae. Lungs: CTA Bilaterally. No Wheezing/Rhonchi/Rales. Heart:  S1 S2,  irregular, No murmur, No Rubs, No Gallops Abdomen: Soft, Non distended, Non tender.  +Bowel sounds, Extremities: No c/c/e Psych:   Not anxious or agitated. Neurologic:  No acute neurological deficit. Vital signs/Intake and Output: 
Visit Vitals /78 Pulse (!) 116 Temp 97.5 °F (36.4 °C) Resp 16 Ht 5' 6\" (1.676 m) Wt 68.9 kg (152 lb) SpO2 90% BMI 24.53 kg/m² Current Shift:  02/13 0701 - 02/13 1900 In: 500 [P.O.:500] Out: 250 [Urine:250] Last three shifts:  02/11 1901 - 02/13 0700 In: 167.3 [I.V.:167.3] Out: 3623 [VSRA:2944] Labs: Results:  
   
Chemistry Recent Labs  
  02/13/19 
4453 02/12/19 
0515 02/11/19 
1620 GLU 87 99 101*  140 138  
K 3.7 4.2 4.6  108 108 CO2 23 20* 22 BUN 58* 58* 56* CREA 2.97* 2.90* 2.88* CA 8.3* 8.1* 8.4* AGAP 12 12 8 BUCR 20 20 19 AP  --   --  116 TP  --   --  7.1 ALB  --   --  3.9 GLOB  --   --  3.2 AGRAT  --   --  1.2  
  
CBC w/Diff Recent Labs  
  02/13/19 
0607 02/12/19 
0515 02/11/19 
1620 WBC 9.8 10.7 12.8 RBC 3.64* 3.57* 3.73* HGB 9.9* 9.6* 10.2* HCT 31.7* 31.5* 33.1*  
 281 340 GRANS 75* 77* 80* LYMPH 11* 12* 9*  
EOS 2 1 1 Cardiac Enzymes Recent Labs  
  02/12/19 
0515 02/12/19 
0020 CPK 72 78 CKND1 4.9* 5.0* Coagulation Recent Labs  
  02/13/19 
1600 02/13/19 
1002 APTT 49.1* 67.2* Lipid Panel No results found for: CHOL, CHOLPOCT, CHOLX, CHLST, CHOLV, 622452, HDL, LDL, LDLC, DLDLP, 120174, VLDLC, VLDL, TGLX, TRIGL, TRIGP, TGLPOCT, CHHD, CHHDX  
BNP No results for input(s): BNPP in the last 72 hours. Liver Enzymes Recent Labs  
  02/11/19 
1620 TP 7.1 ALB 3.9  SGOT 13* Thyroid Studies Lab Results Component Value Date/Time TSH 2.00 02/11/2019 04:20 PM  
    
Procedures/imaging: see electronic medical records for all procedures/Xrays and details which were not copied into this note but were reviewed prior to creation of Plan

## 2019-02-13 NOTE — PROGRESS NOTES
Problem: Falls - Risk of 
Goal: *Absence of Falls Document Lois Kidd Fall Risk and appropriate interventions in the flowsheet. Outcome: Progressing Towards Goal 
Fall Risk Interventions: 
Mobility Interventions: Communicate number of staff needed for ambulation/transfer Medication Interventions: Evaluate medications/consider consulting pharmacy Elimination Interventions: Call light in reach

## 2019-02-13 NOTE — PROGRESS NOTES
Cm met with pt and son Maxime Butt at bedside, pt alert and responsive x4 role as discharge planner explained to so as well to pt, pt lives independently at home with wife who has dementia that son states is getting worse,pt still drives and does all the cooking due to wife declining health, pt states he has a total of 3 sons and 1 daughter, daughter is the only child that lives locally, son stated that siblings has discussed multiply times regarding assistance care for their parents but pt not interested and wants to stay home,patient and son at this time agreeable to home health or inpatient services if needed for short stay,son feels his fathers days are limited with living independent without help, cm did provide son with SNF and H/H listings, patients wife and daughter are expected to visit later today,son plans to return back to [facility] today but plans to discuss with his sibling as an group regarding facilities and home health services,cm updated white board with cm contact information and will cont to follow thru with pt.

## 2019-02-13 NOTE — PROGRESS NOTES
Bedside and Verbal shift change report received from MAR Goncalves RN (offgoing nurse). Report included the following information SBAR, Kardex, Intake/Output, MAR and Recent Results. 2015 Shift assessment completed, see EMR 
 
0030 Reassessment completed, see EMR 
 
0345 Reassessment completed, see EMR 
 
0607 .7 within therapeutic range, no change to heparin drip needed. Bedside and Verbal shift change report given to BISI Jovel RN (oncoming nurse) Report included the following information SBAR, Kardex, Intake/Output, MAR, Accordion and Recent Results.

## 2019-02-14 ENCOUNTER — APPOINTMENT (OUTPATIENT)
Dept: NUCLEAR MEDICINE | Age: 84
DRG: 291 | End: 2019-02-14
Attending: INTERNAL MEDICINE
Payer: MEDICARE

## 2019-02-14 ENCOUNTER — APPOINTMENT (OUTPATIENT)
Dept: NON INVASIVE DIAGNOSTICS | Age: 84
DRG: 291 | End: 2019-02-14
Attending: INTERNAL MEDICINE
Payer: MEDICARE

## 2019-02-14 LAB
ANION GAP SERPL CALC-SCNC: 12 MMOL/L (ref 3–18)
APTT PPP: 76.4 SEC (ref 23–36.4)
BUN SERPL-MCNC: 58 MG/DL (ref 7–18)
BUN/CREAT SERPL: 19 (ref 12–20)
CALCIUM SERPL-MCNC: 8.6 MG/DL (ref 8.5–10.1)
CHLORIDE SERPL-SCNC: 103 MMOL/L (ref 100–108)
CO2 SERPL-SCNC: 25 MMOL/L (ref 21–32)
CREAT SERPL-MCNC: 3.04 MG/DL (ref 0.6–1.3)
ERYTHROCYTE [DISTWIDTH] IN BLOOD BY AUTOMATED COUNT: 15.4 % (ref 11.6–14.5)
GLUCOSE SERPL-MCNC: 105 MG/DL (ref 74–99)
HCT VFR BLD AUTO: 32.8 % (ref 36–48)
HGB BLD-MCNC: 10.2 G/DL (ref 13–16)
MCH RBC QN AUTO: 27 PG (ref 24–34)
MCHC RBC AUTO-ENTMCNC: 31.1 G/DL (ref 31–37)
MCV RBC AUTO: 86.8 FL (ref 74–97)
PLATELET # BLD AUTO: 287 K/UL (ref 135–420)
PMV BLD AUTO: 10.9 FL (ref 9.2–11.8)
POTASSIUM SERPL-SCNC: 3.3 MMOL/L (ref 3.5–5.5)
RBC # BLD AUTO: 3.78 M/UL (ref 4.7–5.5)
SODIUM SERPL-SCNC: 140 MMOL/L (ref 136–145)
STRESS BASELINE DIAS BP: 74 MMHG
STRESS BASELINE HR: 115 BPM
STRESS BASELINE SYS BP: 124 MMHG
STRESS ST DEPRESSION: 0 MM
STRESS ST ELEVATION: 0 MM
STRESS STAGE 1 DURATION: NORMAL MIN:SEC
STRESS STAGE 1 HR: 100 BPM
STRESS STAGE RECOVERY 1 BP: NORMAL MMHG
STRESS STAGE RECOVERY 1 DURATION: NORMAL MIN:SEC
STRESS STAGE RECOVERY 1 HR: 111 BPM
STRESS TARGET HR: 107 BPM
WBC # BLD AUTO: 16.4 K/UL (ref 4.6–13.2)

## 2019-02-14 PROCEDURE — 80048 BASIC METABOLIC PNL TOTAL CA: CPT

## 2019-02-14 PROCEDURE — 74011250636 HC RX REV CODE- 250/636: Performed by: INTERNAL MEDICINE

## 2019-02-14 PROCEDURE — 85027 COMPLETE CBC AUTOMATED: CPT

## 2019-02-14 PROCEDURE — 74011250637 HC RX REV CODE- 250/637: Performed by: INTERNAL MEDICINE

## 2019-02-14 PROCEDURE — 94640 AIRWAY INHALATION TREATMENT: CPT

## 2019-02-14 PROCEDURE — 85610 PROTHROMBIN TIME: CPT

## 2019-02-14 PROCEDURE — 74011250636 HC RX REV CODE- 250/636: Performed by: HOSPITALIST

## 2019-02-14 PROCEDURE — 74011000250 HC RX REV CODE- 250: Performed by: INTERNAL MEDICINE

## 2019-02-14 PROCEDURE — 65660000000 HC RM CCU STEPDOWN

## 2019-02-14 PROCEDURE — 93017 CV STRESS TEST TRACING ONLY: CPT

## 2019-02-14 PROCEDURE — 74011250636 HC RX REV CODE- 250/636: Performed by: EMERGENCY MEDICINE

## 2019-02-14 PROCEDURE — 85730 THROMBOPLASTIN TIME PARTIAL: CPT

## 2019-02-14 PROCEDURE — 94760 N-INVAS EAR/PLS OXIMETRY 1: CPT

## 2019-02-14 PROCEDURE — 74011250637 HC RX REV CODE- 250/637: Performed by: HOSPITALIST

## 2019-02-14 PROCEDURE — 77030013140 HC MSK NEB VYRM -A

## 2019-02-14 PROCEDURE — 36415 COLL VENOUS BLD VENIPUNCTURE: CPT

## 2019-02-14 PROCEDURE — A9500 TC99M SESTAMIBI: HCPCS

## 2019-02-14 RX ORDER — POTASSIUM CHLORIDE 20 MEQ/1
40 TABLET, EXTENDED RELEASE ORAL EVERY 4 HOURS
Status: DISPENSED | OUTPATIENT
Start: 2019-02-14 | End: 2019-02-14

## 2019-02-14 RX ORDER — POTASSIUM CHLORIDE 20 MEQ/1
20 TABLET, EXTENDED RELEASE ORAL
Status: COMPLETED | OUTPATIENT
Start: 2019-02-14 | End: 2019-02-14

## 2019-02-14 RX ORDER — METOPROLOL SUCCINATE 100 MG/1
100 TABLET, EXTENDED RELEASE ORAL DAILY
Status: DISCONTINUED | OUTPATIENT
Start: 2019-02-15 | End: 2019-02-16 | Stop reason: HOSPADM

## 2019-02-14 RX ORDER — DILTIAZEM HYDROCHLORIDE 240 MG/1
240 CAPSULE, COATED, EXTENDED RELEASE ORAL DAILY
Status: DISCONTINUED | OUTPATIENT
Start: 2019-02-14 | End: 2019-02-16 | Stop reason: HOSPADM

## 2019-02-14 RX ORDER — METOPROLOL TARTRATE 5 MG/5ML
5 INJECTION INTRAVENOUS ONCE
Status: COMPLETED | OUTPATIENT
Start: 2019-02-14 | End: 2019-02-14

## 2019-02-14 RX ADMIN — REGADENOSON 0.4 MG: 0.08 INJECTION, SOLUTION INTRAVENOUS at 10:01

## 2019-02-14 RX ADMIN — METOPROLOL SUCCINATE 50 MG: 50 TABLET, EXTENDED RELEASE ORAL at 10:20

## 2019-02-14 RX ADMIN — DILTIAZEM HYDROCHLORIDE 240 MG: 240 CAPSULE, COATED, EXTENDED RELEASE ORAL at 17:58

## 2019-02-14 RX ADMIN — PANTOPRAZOLE SODIUM 40 MG: 40 TABLET, DELAYED RELEASE ORAL at 10:20

## 2019-02-14 RX ADMIN — CEFTRIAXONE 1 G: 1 INJECTION, POWDER, FOR SOLUTION INTRAMUSCULAR; INTRAVENOUS at 12:43

## 2019-02-14 RX ADMIN — POTASSIUM CHLORIDE 20 MEQ: 20 TABLET, EXTENDED RELEASE ORAL at 14:47

## 2019-02-14 RX ADMIN — FUROSEMIDE 40 MG: 10 INJECTION, SOLUTION INTRAMUSCULAR; INTRAVENOUS at 22:22

## 2019-02-14 RX ADMIN — BUDESONIDE 500 MCG: 0.5 INHALANT RESPIRATORY (INHALATION) at 20:45

## 2019-02-14 RX ADMIN — METOPROLOL TARTRATE 5 MG: 1 INJECTION, SOLUTION INTRAVENOUS at 14:47

## 2019-02-14 RX ADMIN — POTASSIUM CHLORIDE 40 MEQ: 20 TABLET, EXTENDED RELEASE ORAL at 10:20

## 2019-02-14 RX ADMIN — BUDESONIDE 500 MCG: 0.5 INHALANT RESPIRATORY (INHALATION) at 07:18

## 2019-02-14 RX ADMIN — HEPARIN SODIUM AND DEXTROSE 10 UNITS/KG/HR: 10000; 5 INJECTION INTRAVENOUS at 20:21

## 2019-02-14 RX ADMIN — TERAZOSIN HYDROCHLORIDE 10 MG: 5 CAPSULE ORAL at 22:22

## 2019-02-14 RX ADMIN — FUROSEMIDE 40 MG: 10 INJECTION, SOLUTION INTRAMUSCULAR; INTRAVENOUS at 10:20

## 2019-02-14 NOTE — PROGRESS NOTES
Chart reviewed,cm spoke with Fabi pressley for pt to start PT eval for safe d/c planning recommendations,cm will cont to review and remain available.

## 2019-02-14 NOTE — PROGRESS NOTES
Cardiology Progress Note Patient: Pearl Abrams        Sex: male          DOA: 2/11/2019 YOB: 1924      Age:  80 y.o.        LOS:  LOS: 3 days Assessment/Plan Principal Problem: 
  New onset atrial fibrillation ([facility] [address] [address] (2/11/2019) Active Problems: 
  CHF (congestive heart failure) ([facility] [address] (2/11/2019) CKD (chronic kidney disease) stage 3, GFR 30-59 ml/min ([facility]) (2/11/2019) Hypertension () Plan: 
Stress test today If stress test is normal then start warfarin/heparin Afib rate improved Continue with current meds Transfer to  cardiac tele Subjective:  
 cc: SOB 
CHF Afib with RVR REVIEW OF SYSTEMS:  
 
General: No fevers or chills. Cardiovascular: No chest pain or pressure. No palpitations. No ankle swelling Pulmonary: No SOB, orthopnea, PND Gastrointestinal: No nausea, vomiting or diarrhea Objective:  
  
Visit Vitals /74 Pulse (!) 113 Temp 97.6 °F (36.4 °C) Resp 14 Ht 5' 6\" (1.676 m) Wt 68.9 kg (152 lb) SpO2 (!) 89% BMI 24.53 kg/m² Body mass index is 24.53 kg/m². Physical Exam: 
Patient seen and examined in stress lab General Appearance: Comfortable, not using accessory muscles of respiration. NECK: No JVD, no thyroidomeglay. LUNGS: Clear bilaterally. HEART: S1 variable +S2 audible, ABD: Non-tender, BS Audible EXT: 1+ edema, and no cysnosis. VASCULAR EXAM: Pulses are intact. PSYCHIATRIC EXAM: Mood is appropriate. Medication: 
Current Facility-Administered Medications Medication Dose Route Frequency  potassium chloride (K-DUR, KLOR-CON) SR tablet 40 mEq  40 mEq Oral Q4H  
 technetium sestamibi (CARDIOLITE) injection 36 millicurie  36 millicurie IntraVENous ONCE  
 regadenoson (LEXISCAN) injection 0.4 mg  0.4 mg IntraVENous RAD ONCE  
 metoprolol succinate (TOPROL-XL) XL tablet 50 mg  50 mg Oral DAILY  cefTRIAXone (ROCEPHIN) 1 g in sterile water (preservative free) 10 mL IV syringe  1 g IntraVENous Q24H  
 budesonide (PULMICORT) 500 mcg/2 ml nebulizer suspension  500 mcg Nebulization BID RT  
 ipratropium (ATROVENT) 0.02 % nebulizer solution 0.5 mg  0.5 mg Nebulization Q4H PRN  
 dilTIAZem CD (CARDIZEM CD) capsule 120 mg  120 mg Oral DAILY  heparin 25,000 units in D5W 250 ml infusion  12-25 Units/kg/hr IntraVENous TITRATE  terazosin (HYTRIN) capsule 10 mg  10 mg Oral QHS  furosemide (LASIX) injection 40 mg  40 mg IntraVENous BID  pantoprazole (PROTONIX) tablet 40 mg  40 mg Oral DAILY Lab/Data Reviewed: 
Procedures/imaging: see electronic medical records for all procedures/Xrays  
and details which were not copied into this note but were reviewed prior to creation of Plan 
  
 
All lab results for the last 24 hours reviewed. Recent Labs  
  02/14/19 
0432 02/13/19 
4446 02/12/19 
0515 WBC 16.4* 9.8 10.7 HGB 10.2* 9.9* 9.6* HCT 32.8* 31.7* 31.5*  265 281 Recent Labs  
  02/14/19 
0432 02/13/19 
3099 02/12/19 
0515  143 140  
K 3.3* 3.7 4.2  108 108 CO2 25 23 20* * 87 99 BUN 58* 58* 58* CREA 3.04* 2.97* 2.90* CA 8.6 8.3* 8.1* Signed By: Shala Quiñones MD   
 February 14, 2019 Discussed with the patient and Dr. Tanesha Metcalf. Atrial fibrillation rate is suboptimal again. Increased metoprolol succinate from 50 mg to 100 mg Increase Cardizem CD from 120 to 240 mg.. THX

## 2019-02-14 NOTE — PROGRESS NOTES
Cm spoke with patients daughter Kathie Quintero along with Valorie Lerner from [facility], cm role as d/c planner explained at this time patients disposition not finalized yet, pt still needs to be seen by PT for recommendations,daughter would prefer pt to go to inpatient rehab,she feels her father will recovery better there where at home he is her mother's primary caregiver whom has dementia and seem to become more of a struggle to handle along with caring for himself, due to declining health of her mother daughter now works from home to assist more with her mother but does live in separate home in [place], at this time if rehab is recommended daughter would like cm to start search in [address] [facility] completed for [address] at this time.

## 2019-02-14 NOTE — PROGRESS NOTES
Hospitalist Progress Note Patient: Rico Leyden MRN: [id number]  CSN: [id number] YOB: 1924  Age: 80 y.o. Sex: male DOA: 2/11/2019 LOS:  LOS: 3 days Assessment/Plan Patient Active Problem List  
Diagnosis Code  CHF (congestive heart failure) ([facility]) I50.9  New onset atrial fibrillation ([facility]) I48.91  
 CKD (chronic kidney disease) stage 3, GFR 30-59 ml/min ([facility]) N18.3  Hypertension I10  
  
 
 
 
79 yo male admitted for A-fib with RVR, SOB. Patient comfortable, denies any complains. His HR is still elevated, off cardizem drip. No acute events overnight. A-fib with RVR -  off cardizem drip, on lopressor and cardizem. Received digoxin. Continue on heparin drip. TSH in normal range. CHF - acute on chronic diastolic Continue with lasix. CE flat Echo with EF of 61-65%, moderate concentric LV hypertrophy. Cardiology following. Plan for stress test today. Discussed with Dr. Pereyra Phlegm 
  
Heme/onc - Follow H&H, plts. Leukocytosis - no clear evidence of infection. CXR with no infiltrate, UA with no evidence of UTI On ceftriaxone 
  
 
CKD - stage 3, monitor in the setting of diuresis. 
  
Endocrine -   
TSH in normal range 
  
GI - cardiac diet. 
  
Prophylaxis - DVT: heparin, GI: protonix 
  
 
 PT/OT Disposition : 2-3 days Review of systems General: No fevers or chills. Cardiovascular: No chest pain or pressure. No palpitations. Pulmonary: No shortness of breath. Gastrointestinal: No nausea, vomiting. Physical Exam: 
General: Awake, cooperative, no acute distress   
HEENT: NC, Atraumatic. PERRLA, anicteric sclerae. Lungs: CTA Bilaterally. No Wheezing/Rhonchi/Rales. Heart:  S1 S2,  irregular, No murmur, No Rubs, No Gallops Abdomen: Soft, Non distended, Non tender.  +Bowel sounds, Extremities: LE edema bilaterally improving. Erythematous changes on skin. Psych:   Not anxious or agitated. Neurologic:  No acute neurological deficit. Vital signs/Intake and Output: 
Visit Vitals /67 Pulse (!) 113 Temp 97.6 °F (36.4 °C) Resp 14 Ht 5' 6\" (1.676 m) Wt 68.9 kg (152 lb) SpO2 (!) 89% BMI 24.53 kg/m² Current Shift:  02/14 0701 - 02/14 1900 In: -  
Out: 500 [Urine:500] Last three shifts:  02/12 1901 - 02/14 0700 In: 688.2 [P.O.:500; I.V.:188.2] Out: 1013 Fermin Ryan [[id number]] Labs: Results:  
   
Chemistry Recent Labs  
  02/14/19 
1729 02/13/19 
6835 02/12/19 
0515 02/11/19 
1620 * 87 99 101*  143 140 138  
K 3.3* 3.7 4.2 4.6  108 108 108 CO2 25 23 20* 22 BUN 58* 58* 58* 56* CREA 3.04* 2.97* 2.90* 2.88* CA 8.6 8.3* 8.1* 8.4* AGAP 12 12 12 8 BUCR 19 20 20 19 AP  --   --   --  116 TP  --   --   --  7.1 ALB  --   --   --  3.9 GLOB  --   --   --  3.2 AGRAT  --   --   --  1.2  
  
CBC w/Diff Recent Labs  
  02/14/19 
0432 02/13/19 
0607 02/12/19 
0515 02/11/19 
1620 WBC 16.4* 9.8 10.7 12.8 RBC 3.78* 3.64* 3.57* 3.73* HGB 10.2* 9.9* 9.6* 10.2* HCT 32.8* 31.7* 31.5* 33.1*  
 265 281 340 GRANS  --  75* 77* 80* LYMPH  --  11* 12* 9* EOS  --  2 1 1 Cardiac Enzymes Recent Labs  
  02/12/19 
0515 02/12/19 
0020 CPK 72 78 CKND1 4.9* 5.0* Coagulation Recent Labs  
  02/14/19 
0432 02/13/19 
2240 APTT 76.4* 108.5* Lipid Panel No results found for: CHOL, CHOLPOCT, CHOLX, CHLST, CHOLV, 318766, HDL, LDL, LDLC, DLDLP, 280533, VLDLC, VLDL, TGLX, TRIGL, TRIGP, TGLPOCT, CHHD, CHHDX  
BNP No results for input(s): BNPP in the last 72 hours. Liver Enzymes Recent Labs  
  02/11/19 
1620 TP 7.1 ALB 3.9  SGOT 13* Thyroid Studies Lab Results Component Value Date/Time TSH 2.00 02/11/2019 04:20 PM  
    
Procedures/imaging: see electronic medical records for all procedures/Xrays and details which were not copied into this note but were reviewed prior to creation of Plan

## 2019-02-14 NOTE — PROGRESS NOTES
Problem: Falls - Risk of 
Goal: *Absence of Falls Document Lucila High Fall Risk and appropriate interventions in the flowsheet. Outcome: Progressing Towards Goal 
Fall Risk Interventions: 
Mobility Interventions: Communicate number of staff needed for ambulation/transfer Medication Interventions: Evaluate medications/consider consulting pharmacy Elimination Interventions: Call light in reach, Patient to call for help with toileting needs

## 2019-02-14 NOTE — PROGRESS NOTES
[facility] 
0800 Assessment complete 9302 Patient traveling to nuclear medicine with medic Alphonse 1015 Patient back in room 1200 Reassessment complete, no changes 1600 Reassessment complete, no changes 1635 Report called to [facility] JAIDA Karimi on [address] [facility], patient will transfer upstairs

## 2019-02-14 NOTE — PROGRESS NOTES
Bedside and Verbal shift change report given to Yolanda David RN (oncoming nurse) by Maile Reveles RN (offgoing nurse). Report included the following information SBAR, Intake/Output, MAR, Recent Results, Cardiac Rhythm afib and Quality Measures.

## 2019-02-14 NOTE — PROGRESS NOTES
Bedside and Verbal shift change report received from Shannan Amato RN (offgoing nurse). Report included the following information SBAR, Kardex, Intake/Output, MAR and Recent Results. 1945 Shift assessment completed, see EMR 
 
2240 Ptt 108.5 and within therapeutic range no change to heparin drip 0015 Reassessment completed, see EMR 
 
0430 Reassessment completed, see EMR 
 
0432 Ptt 76.4 and within therapeutic range no change to heparin drip 
 
0540 Dr. Baldemar Cordero on unit notified her that patient's K+ level 3.3, having frequent PVC,  and getting lasix twice daily. New order given for 40 mEq Potassium chloride q4h for 2 doses. 1465 Patient refused potassium tablets and stated \" My doctor told me to stay away for potassium and salt because my potassium was too high. \" Educated patient about being on being on lasix which can cause low potassium level. Patient is still refused to take potassium replacement. Bedside and Verbal shift change report given to Shannan Amato RN (oncoming nurse) . Report included the following information SBAR, Kardex, Intake/Output, MAR and Recent Results. Notified oncoming nurse that patient refused potassium replacement.
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
## 2019-02-14 NOTE — PROGRESS NOTES
CRITICAL CARE INTERDISCIPLINARY ROUNDS Patient Information: 
 
Name:   Keara Shankar Age:   80 y.o. Admission Date:   2/11/2019 Critical Care Day: 3 Surgery Date:  N/A Attending Provider:   Clarice Rendon MD 
 
Surgeon:   N/A Consultant(s):   Cardiology Critical Care Physician:  Dr Sujey Leslie Code Status: Full Code Problem List:    
Patient Active Problem List  
Diagnosis Code  CHF (congestive heart failure) ([facility]) I50.9  New onset atrial fibrillation ([facility]) I48.91  
 CKD (chronic kidney disease) stage 3, GFR 30-59 ml/min ([facility]) N18.3  Hypertension I10 Principal Problem:  New onset atrial fibrillation ([facility] [address] During rounds the following quality care indicators and evidence based practices were addressed :  DVT Prophylaxis, PUD Prophylaxis, Nutritional Status and Critical Care Interventions Heparin drip Do Day N/A (M-Care N/A) ; Central Line Day: N/A Isolation: N/A ; Antibiotic Stewardship: Rocephin ; Probiotics Necessary: N/A Glycemic Control:  
Recent Labs  
  02/14/19 
0432 02/13/19 
7276 02/12/19 
0515 * 87 99  
; No results for input(s): PHI, PCO2I, PO2I in the last 72 hours. No lab exists for component: 3 Interdisciplinary team rounds were held with the following team membersCare Management, Diabetes Treatment Specialist, Nursing, Nutrition, Pastoral Care, Pharmacy, Physical Therapy, Physician and Respiratory Therapy. Plan of care discussed. Goals of Care/ Recommendations: Transfer to telemetry See clinical pathway and/or care plan for interventions and desired outcomes. Critical Care Discharge Status:  Yellow

## 2019-02-14 NOTE — PROGRESS NOTES
[facility] Pulmonary, Critical Care, and Sleep Medicine Pulm/CC Note Name: Murali Jackson MRN: [id number] : 11/3/1924 Hospital: [facility] Date: 2019  Room: [facility] Subjective: Afib with RVR - admitted to icu on 19. Patient is a 80 y.o. male with hx of HTN. He went to PCP office yesterday because of swelling in his legs and SOB. He was found to have fast Afib and advised to come to [facility] ER. 
 
19 Patient awake, alert S/p cardiac stress test  
Afib rate controlled-currently 90s No CP or cough Breathing is stable BP stable. He is on heparin drip. Not needing cardizem drip. UOP 1.5 L last night SH - stopped smoking at age 58; hx of 40 yrs smoking at < 1 ppd; stopped alcohol in 46s. Past Medical History:  
Diagnosis Date  A-fib ([facility] [address]  CHF (congestive heart failure) ([facility] [address] 2019  Chronic kidney disease Cr 2.2 which is chronic - at least since   Hypertension Past Surgical History:  
Procedure Laterality Date  HX ORTHOPAEDIC Back surgery Prior to Admission medications Medication Sig Start Date End Date Taking? Authorizing Provider  
terazosin (HYTRIN) 10 mg capsule Take 10 mg by mouth nightly. Yes Other, MD Mauro  
calcium-vitamin D (OYSTER SHELL) 500 mg(1,250mg) -200 unit per tablet Take 1 Tab by mouth two (2) times daily (with meals). Yes Catrachita, MD Mauro  
cyanocobalamin (VITAMIN B-12) 500 mcg tablet Take 500 mcg by mouth daily. Yes Catrachita, MD Mauro  
 
No Known Allergies Social History Tobacco Use  Smoking status: Former Smoker  Smokeless tobacco: Never Used Substance Use Topics  Alcohol use: No  
  
History reviewed. No pertinent family history. Current Facility-Administered Medications Medication Dose Route Frequency  potassium chloride (K-DUR, KLOR-CON) SR tablet 40 mEq  40 mEq Oral Q4H  
  metoprolol succinate (TOPROL-XL) XL tablet 50 mg  50 mg Oral DAILY  cefTRIAXone (ROCEPHIN) 1 g in sterile water (preservative free) 10 mL IV syringe  1 g IntraVENous Q24H  
 budesonide (PULMICORT) 500 mcg/2 ml nebulizer suspension  500 mcg Nebulization BID RT  
 dilTIAZem CD (CARDIZEM CD) capsule 120 mg  120 mg Oral DAILY  heparin 25,000 units in D5W 250 ml infusion  12-25 Units/kg/hr IntraVENous TITRATE  terazosin (HYTRIN) capsule 10 mg  10 mg Oral QHS  furosemide (LASIX) injection 40 mg  40 mg IntraVENous BID  pantoprazole (PROTONIX) tablet 40 mg  40 mg Oral DAILY Review of Systems: 
Ears, nose, mouth, throat, and face: no difficulty in swallowing Respiratory: no cough or SOB or hemoptysis Cardiovascular: no chest pain or palpitations, + bilateral leg edema Gastrointestinal: no abd pain, vomitting or diarrhea, no bleeding symptoms Genitourinary: No urinary symptoms Neurological: No focal weakness or seizures Behvioral/Psych: No anxiety or depression Constitutional: No fever or chills Objective: 
Vital Signs:   
Visit Vitals /74 Pulse (!) 113 Temp 98 °F (36.7 °C) Resp 14 Ht 5' 6\" (1.676 m) Wt 68.9 kg (152 lb) SpO2 (!) 89% BMI 24.53 kg/m² O2 Device: Room air Temp (24hrs), Av.9 °F (36.6 °C), Min:97.4 °F (36.3 °C), Max:98.5 °F (36.9 °C) Intake/Output:  
Last shift:       07 -  190 In: -  
Out: 500 [Urine:500] Last 3 shifts:  190 -  0700 In: 688.2 [P.O.:500; I.V.:188.2] Out: 1013 Fermin Ryan [[id number]] Intake/Output Summary (Last 24 hours) at 2019 1308 Last data filed at 2019 5043 Gross per 24 hour Intake 430.69 ml Output 2050 ml Net -1619.31 ml Physical Exam:  
Comfortable; on room air; acyanotic; appears good than stated age HEENT: pupils not dilated, no scleral jaundice Neck: No adenopathy or thyroid swelling CVS: S1S2 no murmurs; JVD not elevated; irreg pulse RS: Mod air entry bilaterally, no wheezes; no crackles; normal respirations Abd: soft, non tender, no abd distension Neuro: non focal, awake, alert Extrm: mild bilateral pitting leg edema Skin: no rash Lymphatic: no cervical or supraclavicular adenopathy Psych: normal mood Data review:  
 
Recent Results (from the past 12 hour(s)) METABOLIC PANEL, BASIC Collection Time: 02/14/19  4:32 AM  
Result Value Ref Range Sodium 140 136 - 145 mmol/L Potassium 3.3 (L) 3.5 - 5.5 mmol/L Chloride 103 100 - 108 mmol/L  
 CO2 25 21 - 32 mmol/L Anion gap 12 3.0 - 18 mmol/L Glucose 105 (H) 74 - 99 mg/dL BUN 58 (H) 7.0 - 18 MG/DL Creatinine 3.04 (H) 0.6 - 1.3 MG/DL  
 BUN/Creatinine ratio 19 12 - 20 GFR est AA 23 (L) >60 ml/min/1.73m2 GFR est non-AA 19 (L) >60 ml/min/1.73m2 Calcium 8.6 8.5 - 10.1 MG/DL  
CBC W/O DIFF Collection Time: 02/14/19  4:32 AM  
Result Value Ref Range WBC 16.4 (H) 4.6 - 13.2 K/uL  
 RBC 3.78 (L) 4.70 - 5.50 M/uL  
 HGB 10.2 (L) 13.0 - 16.0 g/dL HCT 32.8 (L) 36.0 - 48.0 % MCV 86.8 74.0 - 97.0 FL  
 MCH 27.0 24.0 - 34.0 PG  
 MCHC 31.1 31.0 - 37.0 g/dL  
 RDW 15.4 (H) 11.6 - 14.5 % PLATELET 043 372 - 756 K/uL MPV 10.9 9.2 - 11.8 FL  
PTT Collection Time: 02/14/19  4:32 AM  
Result Value Ref Range aPTT 76.4 (H) 23.0 - 36.4 SEC NUCLEAR CARDIAC STRESS TEST Collection Time: 02/14/19 10:00 AM  
Result Value Ref Range Target  bpm  
 Baseline  bpm  
 Baseline  mmHg Stress Base Diastolic BP 74 mmHg Stress Stage 1 Duration :53 min:sec Stress Stage 1  bpm  
 Recovery Stage 1 Duration 4:6 min:sec Recovery Stage 1  bpm  
 Recovery Stage 1 BP 99/50 mmHg ST Elevation (mm) 0 mm ST Depression (mm) 0 mm No results for input(s): FIO2I, IFO2, HCO3I, IHCO3, HCOPOC, PCO2I, PCOPOC, IPHI, PHI, PHPOC, PO2I, PO2POC in the last 72 hours. No lab exists for component: IPOC2 All Micro Results Procedure Component Value Units Date/Time CULTURE, URINE [[id number]] Collected:  02/13/19 1400 Order Status:  Completed Specimen:  Urine from Clean catch Updated:  02/14/19 1243 Special Requests: NO SPECIAL REQUESTS Culture result: NO GROWTH AFTER 16 HOURS     
  
 
 
ECHO · Estimated left ventricular ejection fraction is 61 - 65%. Left ventricular moderate concentric hypertrophy. E/E' ratio is 16.29. 
· Left atrial cavity size is moderately dilated. · Right atrial cavity size is moderately dilated. · Right ventricular global systolic function is borderline low. · Aortic valve leaflet calcification present. Mild to moderate aortic valve regurgitation is present. · Mitral valve thickening. Mild mitral annular calcification. Trace mitral valve regurgitation. · Moderate tricuspid valve regurgitation is present. Moderate pulmonary hypertension is present. · Moderately elevated central venous pressure (10-15 mmHg); IVC diameter is larger than 21 mm and collapses more than 50% with respiration. Imaging: 
[x]I have personally reviewed the patients chest radiographs images and report Xray 2/13 Results from [facility] Encounter encounter on 02/11/19 XR CHEST PORT Narrative Chest, single view Indication: Atrial fibrillation Comparison: Several prior chest radiographs, most recently 2/11/2019 Findings:  Portable upright AP view of the chest was obtained. No focal 
pneumonic opacity or pneumothorax. Improved appearance to trace bilateral 
pleural effusions. Mild central vascular congestion appears improved. Cardiac 
size and mediastinal contours are stable. No acute osseous abnormality. Degenerative changes of the shoulder girdles unchanged. Impression Impression: 
Improved central vascular congestion with trace bilateral pleural effusions. ·  
 
 
IMPRESSION:  
· Atrial fibrillation with RVR · Acute diastolic CHF · Mod AR · Wheezing - former smoker · CKD stage 3 
 · HTN  
· Chronic anemia RECOMMENDATIONS:  
· Pulm: stable respirations; avoid beta agonists nebs due to Afib. Budesonide nebs bid and prn ipratrop nebs. · Cardiac: metoprolol and cardizem oral for Afib control; not needing cardizem drip; on heparin drip; no significant increase in trops; Cardiology on case; EF normal; s.p cardiac stress test 
· ID: no active issues; UA trace LE, few bacteria; urine cx Neg; ceftriaxone 1 gm daily x 3 days · Renal: watch UOP; stable creatinine; kdur 20 meq once · GI: oral diet; PPI · Neuro: stable · Hem: watch Hb and Plts · Endo: watch BG 
· Nutrition:  Oral diet · Proph:  DVt and GI proph - heparin and protonix · D/w patient and updated · Afib remains controlled; ok for tele bed-pending Will defer respective systems problem management to primary and other consultant Further recommendations will be based on the patient's response to recommended treatment and results of the investigation ordered. Quality Care: PPI, DVT prophylaxis, HOB elevated, Infection control all reviewed and addressed. PAIN AND SEDATION: none · Skin/Wound: no active issues · Nutrition: oral diet as tolerated · Prophylaxis: DVT and GI Prophylaxis reviewed · Restraints: none 
· PT/OT eval and treat: as needed · Lines/Tubes: PIVs 
ADVANCE DIRECTIVE: Full Code Mod complexity decision making was performed in this consultation and evaluation of this patient Mary Martinez MD

## 2019-02-15 LAB
APTT PPP: 119.2 SEC (ref 23–36.4)
APTT PPP: 53.9 SEC (ref 23–36.4)
APTT PPP: 77.3 SEC (ref 23–36.4)
BACTERIA SPEC CULT: NORMAL
INR PPP: 1.3 (ref 0.8–1.2)
PROTHROMBIN TIME: 15.9 SEC (ref 11.5–15.2)
SERVICE CMNT-IMP: NORMAL

## 2019-02-15 PROCEDURE — 65660000000 HC RM CCU STEPDOWN

## 2019-02-15 PROCEDURE — 94760 N-INVAS EAR/PLS OXIMETRY 1: CPT

## 2019-02-15 PROCEDURE — 74011250636 HC RX REV CODE- 250/636: Performed by: INTERNAL MEDICINE

## 2019-02-15 PROCEDURE — 85730 THROMBOPLASTIN TIME PARTIAL: CPT

## 2019-02-15 PROCEDURE — 74011250637 HC RX REV CODE- 250/637: Performed by: INTERNAL MEDICINE

## 2019-02-15 PROCEDURE — 74011000250 HC RX REV CODE- 250: Performed by: INTERNAL MEDICINE

## 2019-02-15 PROCEDURE — 97116 GAIT TRAINING THERAPY: CPT

## 2019-02-15 PROCEDURE — 74011250637 HC RX REV CODE- 250/637: Performed by: HOSPITALIST

## 2019-02-15 PROCEDURE — 94640 AIRWAY INHALATION TREATMENT: CPT

## 2019-02-15 PROCEDURE — 97161 PT EVAL LOW COMPLEX 20 MIN: CPT

## 2019-02-15 PROCEDURE — 74011250636 HC RX REV CODE- 250/636: Performed by: HOSPITALIST

## 2019-02-15 PROCEDURE — 36415 COLL VENOUS BLD VENIPUNCTURE: CPT

## 2019-02-15 RX ORDER — HEPARIN SODIUM 1000 [USP'U]/ML
3000 INJECTION, SOLUTION INTRAVENOUS; SUBCUTANEOUS ONCE
Status: COMPLETED | OUTPATIENT
Start: 2019-02-15 | End: 2019-02-15

## 2019-02-15 RX ORDER — WARFARIN SODIUM 5 MG/1
5 TABLET ORAL ONCE
Status: COMPLETED | OUTPATIENT
Start: 2019-02-15 | End: 2019-02-15

## 2019-02-15 RX ADMIN — BUDESONIDE 500 MCG: 0.5 INHALANT RESPIRATORY (INHALATION) at 07:27

## 2019-02-15 RX ADMIN — FUROSEMIDE 40 MG: 10 INJECTION, SOLUTION INTRAMUSCULAR; INTRAVENOUS at 09:43

## 2019-02-15 RX ADMIN — FUROSEMIDE 40 MG: 10 INJECTION, SOLUTION INTRAMUSCULAR; INTRAVENOUS at 21:56

## 2019-02-15 RX ADMIN — IPRATROPIUM BROMIDE 0.5 MG: 0.5 SOLUTION RESPIRATORY (INHALATION) at 20:42

## 2019-02-15 RX ADMIN — WARFARIN SODIUM 5 MG: 5 TABLET ORAL at 01:30

## 2019-02-15 RX ADMIN — HEPARIN SODIUM 3000 UNITS: 1000 INJECTION INTRAVENOUS; SUBCUTANEOUS at 07:23

## 2019-02-15 RX ADMIN — PANTOPRAZOLE SODIUM 40 MG: 40 TABLET, DELAYED RELEASE ORAL at 09:42

## 2019-02-15 RX ADMIN — CEFTRIAXONE 1 G: 1 INJECTION, POWDER, FOR SOLUTION INTRAMUSCULAR; INTRAVENOUS at 12:21

## 2019-02-15 RX ADMIN — BUDESONIDE 500 MCG: 0.5 INHALANT RESPIRATORY (INHALATION) at 20:38

## 2019-02-15 RX ADMIN — METOPROLOL SUCCINATE 100 MG: 100 TABLET, EXTENDED RELEASE ORAL at 09:42

## 2019-02-15 RX ADMIN — DILTIAZEM HYDROCHLORIDE 240 MG: 240 CAPSULE, COATED, EXTENDED RELEASE ORAL at 17:12

## 2019-02-15 RX ADMIN — TERAZOSIN HYDROCHLORIDE 10 MG: 5 CAPSULE ORAL at 21:56

## 2019-02-15 NOTE — PROGRESS NOTES
Received bedside report from [address]. Pt Aox4, Heparin drip verified, Tele box #8 A-fib HR-125, RA, skin intact, pt resting in bed/bed in low position, wheels locked, call bell within reach. 0943-Pt provided scheduled meds. Pt denies pain or discomfort at this time. 1230-Pt provided assistance with meal set up. Denies pain or discomfort at this time. 1450-Noted PTT-119.2 Heparin drip rate changed per protocol, verified with Gabe Stanford RN. 1500-Bedside and Verbal shift change report given to David Barajas (oncoming nurse) by Saint Martin RN (offgoing nurse). Report included the following information SBAR, Kardex, MAR and Cardiac Rhythm A-fib.

## 2019-02-15 NOTE — PROGRESS NOTES
[facility] Pulmonary, Critical Care, and Sleep Medicine Pulm/CC Note Name: Nayely Domínguez MRN: [id number] : 11/3/1924 Hospital: [facility] [place] Date: 2/15/2019  Room: [facility] Subjective: Afib with RVR - admitted to icu on 19. Patient is a 80 y.o. male with hx of HTN. He went to PCP office yesterday because of swelling in his legs and SOB. He was found to have fast Afib and advised to come to THE [facility] ER. 
 
2/15/19 Patient has been moved out of icu Breathing is good No cough or CP No palpitations SH - stopped smoking at age 58; hx of 40 yrs smoking at < 1 ppd; stopped alcohol in 46s. Past Medical History:  
Diagnosis Date  A-fib ([facility]  CHF (congestive heart failure) ([facility] 2019  Chronic kidney disease Cr 2.2 which is chronic - at least since   Hypertension Past Surgical History:  
Procedure Laterality Date  HX ORTHOPAEDIC Back surgery Prior to Admission medications Medication Sig Start Date End Date Taking? Authorizing Provider  
terazosin (HYTRIN) 10 mg capsule Take 10 mg by mouth nightly. Yes Other, MD Mauro  
calcium-vitamin D (OYSTER SHELL) 500 mg(1,250mg) -200 unit per tablet Take 1 Tab by mouth two (2) times daily (with meals). Yes Catrachita, MD Mauro  
cyanocobalamin (VITAMIN B-12) 500 mcg tablet Take 500 mcg by mouth daily. Yes Other, MD Mauro  
 
No Known Allergies Social History Tobacco Use  Smoking status: Former Smoker  Smokeless tobacco: Never Used Substance Use Topics  Alcohol use: No  
  
History reviewed. No pertinent family history. Current Facility-Administered Medications Medication Dose Route Frequency  Warfarin- Pharmacy to dose   Other Rx Dosing/Monitoring  dilTIAZem CD (CARDIZEM CD) capsule 240 mg  240 mg Oral DAILY  metoprolol succinate (TOPROL-XL) XL tablet 100 mg  100 mg Oral DAILY  budesonide (PULMICORT) 500 mcg/2 ml nebulizer suspension  500 mcg Nebulization BID RT  
 heparin 25,000 units in D5W 250 ml infusion  12-25 Units/kg/hr IntraVENous TITRATE  terazosin (HYTRIN) capsule 10 mg  10 mg Oral QHS  furosemide (LASIX) injection 40 mg  40 mg IntraVENous BID  pantoprazole (PROTONIX) tablet 40 mg  40 mg Oral DAILY Review of Systems: 
Ears, nose, mouth, throat, and face: no difficulty in swallowing Respiratory: no cough or SOB or hemoptysis Cardiovascular: no chest pain or palpitations, + bilateral leg edema Gastrointestinal: no abd pain, vomitting or diarrhea, no bleeding symptoms Genitourinary: No urinary symptoms Neurological: No focal weakness or seizures Behvioral/Psych: No anxiety or depression Constitutional: No fever or chills Objective: 
Vital Signs:   
Visit Vitals BP 98/74 Pulse 96 Temp 97.6 °F (36.4 °C) Resp 16 Ht 5' 6\" (1.676 m) Wt 68.9 kg (152 lb) SpO2 95% BMI 24.53 kg/m² O2 Device: Room air Temp (24hrs), Av.1 °F (36.7 °C), Min:97.6 °F (36.4 °C), Max:98.6 °F (37 °C) Intake/Output:  
Last shift:      No intake/output data recorded. Last 3 shifts:  1901 - 02/15 0700 In: 169.6 [I.V.:169.6] Out: 3725 [FYM:0724] Intake/Output Summary (Last 24 hours) at 2/15/2019 1609 Last data filed at 2/15/2019 6339 Gross per 24 hour Intake 77.86 ml Output 850 ml Net -772.14 ml Physical Exam:  
Comfortable; on room air; acyanotic; appears good than stated age HEENT: no scleral jaundice Neck: No adenopathy or thyroid swelling CVS: S1S2 no murmurs; JVD not elevated; irreg pulse RS: Mod air entry bilaterally, no wheezes; no crackles; normal respirations Abd: soft, non tender, no abd distension Neuro: non focal, awake, alert Extrm: mild bilateral pitting leg edema Skin: no rash Lymphatic: no cervical or supraclavicular adenopathy Psych: normal mood Data review: Recent Results (from the past 12 hour(s)) PTT Collection Time: 02/15/19  4:36 AM  
Result Value Ref Range aPTT 53.9 (H) 23.0 - 36.4 SEC  
PTT Collection Time: 02/15/19  1:18 PM  
Result Value Ref Range aPTT 119.2 (H) 23.0 - 36.4 SEC No results for input(s): FIO2I, IFO2, HCO3I, IHCO3, HCOPOC, PCO2I, PCOPOC, IPHI, PHI, PHPOC, PO2I, PO2POC in the last 72 hours. No lab exists for component: IPOC2 All Micro Results Procedure Component Value Units Date/Time CULTURE, URINE [[id number]] Collected:  02/13/19 1400 Order Status:  Completed Specimen:  Urine from Clean catch Updated:  02/15/19 1036 Special Requests: NO SPECIAL REQUESTS Culture result: NO GROWTH 2 DAYS     
  
 
 
ECHO · Estimated left ventricular ejection fraction is 61 - 65%. Left ventricular moderate concentric hypertrophy. E/E' ratio is 16.29. 
· Left atrial cavity size is moderately dilated. · Right atrial cavity size is moderately dilated. · Right ventricular global systolic function is borderline low. · Aortic valve leaflet calcification present. Mild to moderate aortic valve regurgitation is present. · Mitral valve thickening. Mild mitral annular calcification. Trace mitral valve regurgitation. · Moderate tricuspid valve regurgitation is present. Moderate pulmonary hypertension is present. · Moderately elevated central venous pressure (10-15 mmHg); IVC diameter is larger than 21 mm and collapses more than 50% with respiration. Imaging: 
[x]I have personally reviewed the patients chest radiographs images and report Xray 2/13 Results from [facility] Encounter encounter on 02/11/19 XR CHEST PORT Narrative Chest, single view Indication: Atrial fibrillation Comparison: Several prior chest radiographs, most recently 2/11/2019 Findings:  Portable upright AP view of the chest was obtained. No focal 
pneumonic opacity or pneumothorax.  Improved appearance to trace bilateral 
 pleural effusions. Mild central vascular congestion appears improved. Cardiac 
size and mediastinal contours are stable. No acute osseous abnormality. Degenerative changes of the shoulder girdles unchanged. Impression Impression: 
Improved central vascular congestion with trace bilateral pleural effusions. IMPRESSION:  
· Atrial fibrillation with RVR · Acute diastolic CHF · Mod AR · Wheezing - former smoker · CKD stage 3 
· HTN  
· Chronic anemia RECOMMENDATIONS:  
· Pulm: wheezing resolved; avoid beta agonists nebs due to Afib; on budesonide nebs bid and prn ipratrop nebs. Dc home on same with home neb machine. · Cardiac: metoprolol and cardizem oral for Afib control; on heparin drip; no significant increase in trops; Cardiology on case; EF normal; Nuclear cardiac stress test negative · ID: no active issues; UA trace LE, few bacteria; urine cx Neg; ceftriaxone 1 gm daily x 3 days completed · GI: oral diet; PPI · Nutrition:  Oral diet · Proph:  DVt and GI proph - heparin and protonix · D/w patient and updated; will be happy to see in clinic in 4 weeks · Pulm will sign off - please call if needed Will defer respective systems problem management to primary and other consultant Further recommendations will be based on the patient's response to recommended treatment and results of the investigation ordered. ADVANCE DIRECTIVE: Full Code Mod complexity decision making was performed in this consultation and evaluation of this patient Paige Seaman MD

## 2019-02-15 NOTE — PROGRESS NOTES
Spoke with patients daughter and son both really prefer pt to go to [facility] for rehab services due to location,daughter visited [facility] in [facility] willing to accept tomorrow on Saturday instead of Monday available bed became available,cm did call verified with admissions coordinator Amaya Watts ex [phone],daughter and Melina Uribe from [facility] is agreeable that is [facility] does not accept tomorrow pt can go to [facility] tomorrow, updated and agree, update also provided to weekend cm Harrison Ghosh, pt will need medical transportation tomorrow to [facility] @[address] [facility] [phone], RN please call report prior to pt leaving,send AVS,D/C summary and medication hard scripts. Care Management Interventions PCP Verified by CM: Yes 
Palliative Care Criteria Met (RRAT>21 & CHF Dx)?: No 
Palliative Consult Recommended?: Yes Transition of Care Consult (CM Consult): SNF Physical Therapy Consult: Yes Current Support Network: Lives with Spouse, [address] Confirm Follow Up Transport: Family Plan discussed with Pt/Family/Caregiver: Yes Freedom of Choice Offered: Yes Discharge Location Discharge Placement: Skilled nursing facility

## 2019-02-15 NOTE — PROGRESS NOTES
Bedside and Verbal shift change report given to Kenzie Soni (oncoming nurse) by A Day RN (offgoing nurse). Report included the following information SBAR, Kardex, ED Summary, Procedure Summary, Intake/Output, MAR, Accordion, Recent Results and Med Rec Status.

## 2019-02-15 NOTE — PROGRESS NOTES
Cm reached out to patients daughter Roma Henao, she was informed that pt medically ready for transitioning to rehab and at this time and [facility] pending at this time, cm recommended daughter to select a few more facilities [facility] cannot accept,daughter selected [facility] and [facility], [facility] mancia is second choice, facilities aware pt medically ready for discharge today.

## 2019-02-15 NOTE — PROGRESS NOTES
Problem: Falls - Risk of 
Goal: *Absence of Falls Document Zuleima Noriega Fall Risk and appropriate interventions in the flowsheet. Outcome: Progressing Towards Goal 
Fall Risk Interventions: 
Mobility Interventions: Communicate number of staff needed for ambulation/transfer Medication Interventions: Evaluate medications/consider consulting pharmacy Elimination Interventions: Call light in reach

## 2019-02-15 NOTE — PROGRESS NOTES
Pharmacy Dosing Services: Warfarin Consult for Warfarin Dosing by Pharmacy by Dr. Roxanne Peng Consult provided for this 80 y.o.  male , for indication of Atrial Fibrillation. Day of Therapy 1 Dose to achieve an INR goal of 2-3 Order entered for  Warfarin  5 mg ordered to be given today at 01:00. Significant drug interactions: Pt is also on a Heparin drip LABS   
PT/INR Lab Results Component Value Date/Time INR 1.3 (H) 02/14/2019 12:05 AM  
  
Platelets Lab Results Component Value Date/Time PLATELET 656 90/61/5456 04:32 AM  
  
H/H Lab Results Component Value Date/Time HGB 10.2 (L) 02/14/2019 04:32 AM  
  
 
Pharmacy to follow daily and will provide subsequent Warfarin dosing based on clinical status. MORENITA Meier)  Contact information  [phone]

## 2019-02-15 NOTE — PROGRESS NOTES
163 Report received from Dhara Cornell RN will assess when pt is on the floor 1848 Pt arrived on unit.

## 2019-02-15 NOTE — PROGRESS NOTES
Hospitalist Progress Note Patient: Sarah Diallo MRN: [id number]  CSN: [id number] YOB: 1924  Age: 80 y.o. Sex: male DOA: 2/11/2019 LOS:  LOS: 4 days Assessment/Plan Patient Active Problem List  
Diagnosis Code  CHF (congestive heart failure) ([facility]) I50.9  New onset atrial fibrillation ([facility]) I48.91  
 CKD (chronic kidney disease) stage 3, GFR 30-59 ml/min ([facility]) N18.3  Hypertension I10  
  
 
 
 
81 yo male admitted for A-fib with RVR, SOB. Patient comfortable, denies any complains. Converted to NSR. No acute events overnight. A-fib with RVR -  Now NSR. off cardizem drip, on lopressor and cardizem. Received digoxin. Continue on heparin drip. Started on coumadin TSH in normal range. CHF - acute on chronic diastolic Continue with lasix, beta blocker, no ACE-I or ARB secondary to renal insufficiency. CE flat Echo with EF of 61-65%, moderate concentric LV hypertrophy. Cardiology following. Stress test normal. 
Discussed with Dr. Rubi Joy 
  
Heme/onc - Follow H&H, plts. Leukocytosis - no clear evidence of infection. CXR with no infiltrate, UA with no evidence of UTI On ceftriaxone 
  
 
CKD - stage 3, monitor in the setting of diuresis. 
  
Endocrine -   
TSH in normal range 
  
GI - cardiac diet. 
  
Prophylaxis - DVT: heparin, GI: protonix 
  
 
 PT/OT Disposition : 2-3 days Review of systems General: No fevers or chills. Cardiovascular: No chest pain or pressure. No palpitations. Pulmonary: No shortness of breath. Gastrointestinal: No nausea, vomiting. Physical Exam: 
General: Awake, cooperative, no acute distress   
HEENT: NC, Atraumatic. PERRLA, anicteric sclerae. Lungs: CTA Bilaterally. No Wheezing/Rhonchi/Rales. Heart:  S1 S2,  irregular, No murmur, No Rubs, No Gallops Abdomen: Soft, Non distended, Non tender.  +Bowel sounds, Extremities: LE edema bilaterally improving. Erythematous changes on skin. Psych:   Not anxious or agitated. Neurologic:  No acute neurological deficit. Vital signs/Intake and Output: 
Visit Vitals BP 98/74 Pulse 96 Temp 97.6 °F (36.4 °C) Resp 16 Ht 5' 6\" (1.676 m) Wt 61.6 kg (135 lb 12.9 oz) SpO2 95% BMI 21.92 kg/m² Current Shift:  No intake/output data recorded. Last three shifts:  02/13 1901 - 02/15 0700 In: 169.6 [I.V.:169.6] Out: 3725 [DDGDW:7457] Labs: Results:  
   
Chemistry Recent Labs  
  02/14/19 
3557 02/13/19 
9824 * 87  143  
K 3.3* 3.7  108 CO2 25 23 BUN 58* 58* CREA 3.04* 2.97* CA 8.6 8.3* AGAP 12 12 BUCR 19 20 CBC w/Diff Recent Labs  
  02/14/19 
0432 02/13/19 
1649 WBC 16.4* 9.8  
RBC 3.78* 3.64* HGB 10.2* 9.9*  
HCT 32.8* 31.7*  265 GRANS  --  75* LYMPH  --  11* EOS  --  2 Cardiac Enzymes No results for input(s): CPK, CKND1, ZENAIDA in the last 72 hours. No lab exists for component: Keysha Good Coagulation Recent Labs  
  02/15/19 
1318 02/15/19 
0436  02/14/19 
0005 PTP  --   --   --  15.9* INR  --   --   --  1.3* APTT 119.2* 53.9*   < >  --   
 < > = values in this interval not displayed. Lipid Panel No results found for: CHOL, CHOLPOCT, CHOLX, CHLST, CHOLV, 750487, HDL, LDL, LDLC, DLDLP, 909138, VLDLC, VLDL, TGLX, TRIGL, TRIGP, TGLPOCT, CHHD, CHHDX  
BNP No results for input(s): BNPP in the last 72 hours. Liver Enzymes No results for input(s): TP, ALB, TBIL, AP, SGOT, GPT in the last 72 hours. No lab exists for component: DBIL Thyroid Studies Lab Results Component Value Date/Time TSH 2.00 02/11/2019 04:20 PM  
    
Procedures/imaging: see electronic medical records for all procedures/Xrays and details which were not copied into this note but were reviewed prior to creation of Plan

## 2019-02-15 NOTE — PROGRESS NOTES
1930 Verbal bedside received from Jessica Tinsley 69 off going nurse. Assumed patient care, bed in low position, call light within reach, white board updated. 0600 Patient had uneventful night. No complains of pain was reported or observed. NAD Bedside and Verbal shift change report given to Liliana FLORES RN (oncoming nurse) by Marcos Malik (offgoing nurse). Report included the following information SBAR, Kardex and MAR.

## 2019-02-15 NOTE — PROGRESS NOTES
Problem: Mobility Impaired (Adult and Pediatric) Goal: *Acute Goals and Plan of Care (Insert Text) Physical Therapy Goals Initiated 2/15/2019 and to be accomplished within 3-7 day(s) 1. Patient will move from supine to sit and sit to supine  in bed with supervision/set-up. 2.  Patient will transfer from bed to chair and chair to bed with supervision/set-up using the least restrictive device. 3.  Patient will perform sit to stand with supervision/set-up. 4.  Patient will ambulate with supervision/set-up for 200 feet with the least restrictive device. Outcome: Progressing Towards Goal 
physical Therapy EVALUATION Patient: Areli Adjutant (8 y.o. male) Date: 2/15/2019 Primary Diagnosis: New onset atrial fibrillation ([facility] [I48.91] CHF (congestive heart failure) ([facility] [I50.9] Precautions:   Fall ASSESSMENT : 
Based on the objective data described below, the patient presents with lower extremity weakness, decreased gait quality and endurance, impaired bed mobility and transfers, and overall limitations in functional mobility. Pt lives at home assists in caring for wife. Pt amb s/AD PTA. Pt performed supine to sit with CGA, sit to stand with Jessica. Unsteadiness initially upon standing, improved with cues. Patient ambulated 75 feet with RW, GB applied, CG-Jessica; occasional path deviations and cues required for RW placement. Pt tolerated session well as evidenced by no c/o increased pain, no lightheadedness or dizziness, noSOB. Patient would benefit from skilled inpatient physical therapy to address deficits, progress as tolerated to achieve long term goals and allow safe discharge. Rosanne Peaks Patient will benefit from skilled intervention to address the above impairments. Patients rehabilitation potential is considered to be Good Factors which may influence rehabilitation potential include:  
[]         None noted 
[]         Mental ability/status [x]         Medical condition [x]         Home/family situation and support systems 
[x]         Safety awareness [x]         Pain tolerance/management 
[]         Other: PLAN : 
Recommendations and Planned Interventions: 
[x]           Bed Mobility Training             []    Neuromuscular Re-Education 
[x]           Transfer Training                   []    Orthotic/Prosthetic Training 
[x]           Gait Training                          []    Modalities [x]           Therapeutic Exercises          []    Edema Management/Control 
[x]           Therapeutic Activities            [x]    Patient and Family Training/Education 
[]           Other (comment): Frequency/Duration: Patient will be followed by physical therapy 1-2 times per day to address goals. Discharge Recommendations: Rishabh Arteaga Further Equipment Recommendations for Discharge: N/A  
 
SUBJECTIVE:  
Patient stated I am doing ok, feels good to exercise.  OBJECTIVE DATA SUMMARY:  
 
Past Medical History:  
Diagnosis Date  A-fib ([facility]  [facility] 2/11/2019  Chronic kidney disease Cr 2.2 which is chronic - at least since 2011  Hypertension Past Surgical History:  
Procedure Laterality Date  HX ORTHOPAEDIC Back surgery Barriers to Learning/Limitations: yes;  cognitive Compensate with: Visual Cues, Verbal Cues and Tactile Cues Prior Level of Function/Home Situation: Independent amb s/AD Home Situation Home Environment: Private residence # Steps to Enter: 5 One/Two Story Residence: One story Living Alone: No 
Support Systems: Child(rocio), Family member(s) Patient Expects to be Discharged to[de-identified] Private residence Current DME Used/Available at Home: Cane, straightStrength:   
Strength: Generally decreased, functional 
Tone & Sensation:  
Tone: Normal 
Sensation: Intact Range Of Motion: 
AROM: Generally decreased, functional 
PROM: Generally decreased, functional 
Functional Mobility: 
Bed Mobility: Supine to Sit: Contact guard assistance Sit to Supine: Contact guard assistance Transfers: 
Sit to Stand: Minimum assistance Stand to Sit: Minimum assistance Balance:  
Sitting: Intact Standing: Impaired; With support Standing - Static: Fair Standing - Dynamic : FairAmbulation/Gait Training: 
Distance (ft): 75 Feet (ft) Assistive Device: Gait belt;Walker, rolling Ambulation - Level of Assistance: Contact guard assistance;Minimal assistance Gait Description (WDL): Exceptions to [facility] Gait Abnormalities: Decreased step clearance; Path deviations Base of Support: Narrowed Speed/Jayde: Slow Step Length: Right shortened;Left shortened Pain: 
Pain Scale 1: Numeric (0 - 10) Pain Intensity 1: 0 Activity Tolerance:  
Good Please refer to the flowsheet for vital signs taken during this treatment. After treatment:  
[]         Patient left in no apparent distress sitting up in chair 
[x]         Patient left in no apparent distress in bed 
[x]         Call bell left within reach [x]         Nursing notified 
[]         Caregiver present 
[]         Bed alarm activated COMMUNICATION/EDUCATION:  
[x]         Fall prevention education was provided and the patient/caregiver indicated understanding. [x]         Patient/family have participated as able in goal setting and plan of care. [x]         Patient/family agree to work toward stated goals and plan of care. []         Patient understands intent and goals of therapy, but is neutral about his/her participation. []         Patient is unable to participate in goal setting and plan of care. Thank you for this referral. 
Neita Citizen Time Calculation: 23 mins Eval Complexity: History: MEDIUM  Complexity : 1-2 comorbidities / personal factors will impact the outcome/ POC Exam:LOW Complexity : 1-2 Standardized tests and measures addressing body structure, function, activity limitation and / or participation in recreation  Presentation: LOW Complexity : Stable, uncomplicated  Clinical Decision Making:Low Complexity amb >30 ft c/RW, CGA for safety Overall Complexity:LOW

## 2019-02-15 NOTE — PROGRESS NOTES
Call received from [facility] spoke with Leilani Durant informed cm that facility cannot accept pt, cm left voice message with Cisco Cortes from [facility] and Row[facility] Clements waiting on return call,expanded search to [facility] and [facility].

## 2019-02-15 NOTE — PROGRESS NOTES
Cardiology Progress Note Patient: See Edgar        Sex: male          DOA: 2/11/2019 YOB: 1924      Age:  80 y.o.        LOS:  LOS: 4 days Assessment/Plan Principal Problem: 
  New onset atrial fibrillation ([facility] (2/11/2019) Active Problems: 
  CHF (congestive heart failure) ([facility] (2/11/2019) CKD (chronic kidney disease) stage 3, GFR 30-59 ml/min ([facility]) (2/11/2019) Hypertension () Plan: 
Stress test is normal 
Rate better controlled on Cardizem  mg and metoprolol succinate 100 mg po daily On warfarin/heparin Discussed with patient Discussed with Dr. Deloris Craven I will sign off and please call if any questions f/u with me in on [facility] Subjective:  
 cc: AFib Diastolic heart failure REVIEW OF SYSTEMS:  
 
General: No fevers or chills. Cardiovascular: No chest pain or pressure. No palpitations. improved ankle swelling Pulmonary: improved SOB, orthopnea, PND Gastrointestinal: No nausea, vomiting or diarrhea Objective:  
  
Visit Vitals /54 (BP 1 Location: Left arm, BP Patient Position: At rest) Pulse 72 Temp 97.6 °F (36.4 °C) Resp 16 Ht 5' 6\" (1.676 m) Wt 68.9 kg (152 lb) SpO2 90% BMI 24.53 kg/m² Body mass index is 24.53 kg/m². Physical Exam: 
General Appearance: Comfortable, not using accessory muscles of respiration. NECK: No JVD, no thyroidomeglay. LUNGS: Clear bilaterally. HEART: S1 variable +S2 audible, ABD: Non-tender, BS Audible EXT: trace edema, and no cysnosis. VASCULAR EXAM: Pulses are intact. PSYCHIATRIC EXAM: Mood is appropriate. Medication: 
Current Facility-Administered Medications Medication Dose Route Frequency  Warfarin- Pharmacy to dose   Other Rx Dosing/Monitoring  dilTIAZem CD (CARDIZEM CD) capsule 240 mg  240 mg Oral DAILY  metoprolol succinate (TOPROL-XL) XL tablet 100 mg  100 mg Oral DAILY  budesonide (PULMICORT) 500 mcg/2 ml nebulizer suspension  500 mcg Nebulization BID RT  
 ipratropium (ATROVENT) 0.02 % nebulizer solution 0.5 mg  0.5 mg Nebulization Q4H PRN  
 heparin 25,000 units in D5W 250 ml infusion  12-25 Units/kg/hr IntraVENous TITRATE  terazosin (HYTRIN) capsule 10 mg  10 mg Oral QHS  furosemide (LASIX) injection 40 mg  40 mg IntraVENous BID  pantoprazole (PROTONIX) tablet 40 mg  40 mg Oral DAILY Lab/Data Reviewed: 
Procedures/imaging: see electronic medical records for all procedures/Xrays  
and details which were not copied into this note but were reviewed prior to creation of Plan 
  
 
All lab results for the last 24 hours reviewed. Recent Labs  
  02/14/19 
0432 02/13/19 
6181 WBC 16.4* 9.8 HGB 10.2* 9.9*  
HCT 32.8* 31.7*  265 Recent Labs  
  02/14/19 
0432 02/13/19 
6821  143  
K 3.3* 3.7  108 CO2 25 23 * 87 BUN 58* 58* CREA 3.04* 2.97* CA 8.6 8.3* Signed By: Deysi Chen MD   
 February 15, 2019

## 2019-02-16 VITALS
SYSTOLIC BLOOD PRESSURE: 101 MMHG | HEART RATE: 96 BPM | DIASTOLIC BLOOD PRESSURE: 66 MMHG | WEIGHT: 138.1 LBS | TEMPERATURE: 97.3 F | BODY MASS INDEX: 22.19 KG/M2 | RESPIRATION RATE: 16 BRPM | HEIGHT: 66 IN | OXYGEN SATURATION: 98 %

## 2019-02-16 LAB
APTT PPP: 105.7 SEC (ref 23–36.4)
ERYTHROCYTE [DISTWIDTH] IN BLOOD BY AUTOMATED COUNT: 15.5 % (ref 11.6–14.5)
HCT VFR BLD AUTO: 30 % (ref 36–48)
HGB BLD-MCNC: 9.4 G/DL (ref 13–16)
INR PPP: 1.3 (ref 0.8–1.2)
MCH RBC QN AUTO: 26.9 PG (ref 24–34)
MCHC RBC AUTO-ENTMCNC: 31.3 G/DL (ref 31–37)
MCV RBC AUTO: 86 FL (ref 74–97)
PLATELET # BLD AUTO: 254 K/UL (ref 135–420)
PMV BLD AUTO: 11.6 FL (ref 9.2–11.8)
PROTHROMBIN TIME: 16.2 SEC (ref 11.5–15.2)
RBC # BLD AUTO: 3.49 M/UL (ref 4.7–5.5)
WBC # BLD AUTO: 13.8 K/UL (ref 4.6–13.2)

## 2019-02-16 PROCEDURE — 85730 THROMBOPLASTIN TIME PARTIAL: CPT

## 2019-02-16 PROCEDURE — 85027 COMPLETE CBC AUTOMATED: CPT

## 2019-02-16 PROCEDURE — 74011250637 HC RX REV CODE- 250/637: Performed by: INTERNAL MEDICINE

## 2019-02-16 PROCEDURE — 94760 N-INVAS EAR/PLS OXIMETRY 1: CPT

## 2019-02-16 PROCEDURE — 74011250636 HC RX REV CODE- 250/636: Performed by: HOSPITALIST

## 2019-02-16 PROCEDURE — 74011000250 HC RX REV CODE- 250: Performed by: INTERNAL MEDICINE

## 2019-02-16 PROCEDURE — 36415 COLL VENOUS BLD VENIPUNCTURE: CPT

## 2019-02-16 PROCEDURE — 74011250637 HC RX REV CODE- 250/637: Performed by: HOSPITALIST

## 2019-02-16 PROCEDURE — 74011250636 HC RX REV CODE- 250/636: Performed by: EMERGENCY MEDICINE

## 2019-02-16 PROCEDURE — 97116 GAIT TRAINING THERAPY: CPT

## 2019-02-16 PROCEDURE — 85610 PROTHROMBIN TIME: CPT

## 2019-02-16 PROCEDURE — 94640 AIRWAY INHALATION TREATMENT: CPT

## 2019-02-16 RX ORDER — METOPROLOL SUCCINATE 100 MG/1
100 TABLET, EXTENDED RELEASE ORAL DAILY
Qty: 30 TAB | Refills: 2 | Status: SHIPPED
Start: 2019-02-17

## 2019-02-16 RX ORDER — WARFARIN SODIUM 5 MG/1
5 TABLET ORAL DAILY
Qty: 10 TAB | Refills: 0 | Status: SHIPPED
Start: 2019-02-16

## 2019-02-16 RX ORDER — FUROSEMIDE 40 MG/1
40 TABLET ORAL DAILY
Qty: 30 TAB | Refills: 0 | Status: SHIPPED
Start: 2019-02-16

## 2019-02-16 RX ORDER — DILTIAZEM HYDROCHLORIDE 240 MG/1
240 CAPSULE, COATED, EXTENDED RELEASE ORAL DAILY
Qty: 30 CAP | Refills: 2 | Status: SHIPPED
Start: 2019-02-16

## 2019-02-16 RX ORDER — LEVALBUTEROL INHALATION SOLUTION 1.25 MG/3ML
1.25 SOLUTION RESPIRATORY (INHALATION)
Qty: 10 NEBULE | Refills: 0 | Status: SHIPPED
Start: 2019-02-16

## 2019-02-16 RX ADMIN — PANTOPRAZOLE SODIUM 40 MG: 40 TABLET, DELAYED RELEASE ORAL at 08:28

## 2019-02-16 RX ADMIN — FUROSEMIDE 40 MG: 10 INJECTION, SOLUTION INTRAMUSCULAR; INTRAVENOUS at 08:29

## 2019-02-16 RX ADMIN — HEPARIN SODIUM AND DEXTROSE 11 UNITS/KG/HR: 10000; 5 INJECTION INTRAVENOUS at 05:45

## 2019-02-16 RX ADMIN — METOPROLOL SUCCINATE 100 MG: 100 TABLET, EXTENDED RELEASE ORAL at 08:29

## 2019-02-16 RX ADMIN — BUDESONIDE 500 MCG: 0.5 INHALANT RESPIRATORY (INHALATION) at 07:09

## 2019-02-16 NOTE — PROGRESS NOTES
Dr. Weiss [place] notified of MEWS score. Patient heart rate was elevated due to patient struggling to get under garments off. Heart rate decreased to WNL. Will continue to monitor. Patient stable.

## 2019-02-16 NOTE — DISCHARGE SUMMARY
Discharge Summary    Patient: Bridger Gonzalez MRN: [id number]  CSN: [id number]    YOB: 1924  Age: 80 y.o. Sex: male    DOA: 2/11/2019 LOS:  LOS: 5 days   Discharge Date:      Primary Care Provider:  Aubry Essex, MD    Admission Diagnoses: New onset atrial fibrillation [facility]) [I48.91]  CHF (congestive heart failure) ([facility] [I50.9]    Discharge Diagnoses:    Problem List as of 2/16/2019 Never Reviewed          Codes Class Noted - Resolved    CHF (congestive heart failure) ([facility] ICD-10-CM: I50.9  ICD-9-CM: 428.0  2/11/2019 - Present        * (Principal) New onset atrial fibrillation ([facility] ICD-10-CM: I48.91  ICD-9-CM: 427.31  2/11/2019 - Present        CKD (chronic kidney disease) stage 3, GFR 30-59 ml/min ([facility] ICD-10-CM: N18.3  ICD-9-CM: 585.3  2/11/2019 - Present        Hypertension ICD-10-CM: I10  ICD-9-CM: 401.9  Unknown - Present        RESOLVED: Gram negative sepsis ([facility] ICD-10-CM: A41.50  ICD-9-CM: 038.40, 995.91  3/7/2015 - 3/9/2015        RESOLVED: Atrial fibrillation ([facility] ICD-10-CM: I48.91  ICD-9-CM: 427.31  3/7/2015 - 3/9/2015        RESOLVED: Calculus of gallbladder with acute cholecystitis and obstruction ICD-10-CM: K80.01  ICD-9-CM: 574.01  3/6/2015 - 3/7/2015        RESOLVED: Cholelithiasis and acute cholecystitis with obstruction ICD-10-CM: K80.01  ICD-9-CM: 574.01  3/6/2015 - 3/7/2015              Discharge Medications:     Current Discharge Medication List      START taking these medications    Details   dilTIAZem CD (CARDIZEM CD) 240 mg ER capsule Take 1 Cap by mouth daily. Qty: 30 Cap, Refills: 2      metoprolol succinate (TOPROL-XL) 100 mg tablet Take 1 Tab by mouth daily. Qty: 30 Tab, Refills: 2      furosemide (LASIX) 40 mg tablet Take 1 Tab by mouth daily. Qty: 30 Tab, Refills: 0      warfarin (COUMADIN) 5 mg tablet Take 1 Tab by mouth daily.   Qty: 10 Tab, Refills: 0      levalbuterol (XOPENEX) 1.25 mg/3 mL nebu 3 mL by Nebulization route every four (4) hours as needed. Qty: 10 Nebule, Refills: 0         CONTINUE these medications which have NOT CHANGED    Details   terazosin (HYTRIN) 10 mg capsule Take 10 mg by mouth nightly. calcium-vitamin D (OYSTER SHELL) 500 mg(1,250mg) -200 unit per tablet Take 1 Tab by mouth two (2) times daily (with meals). cyanocobalamin (VITAMIN B-12) 500 mcg tablet Take 500 mcg by mouth daily. Discharge Condition: Good      Consults: Cardiology and Pulmonary/Critical Care      PHYSICAL EXAM   Visit Vitals  /66 (BP 1 Location: Left arm, BP Patient Position: At rest)   Pulse 96   Temp 97.3 °F (36.3 °C)   Resp 16   Ht 5' 6\" (1.676 m)   Wt 62.6 kg (138 lb 1.6 oz)   SpO2 98%   BMI 22.29 kg/m²     General: Awake, cooperative, no acute distress    HEENT: NC, Atraumatic. PERRLA, EOMI. Anicteric sclerae. Lungs:  CTA Bilaterally. No Wheezing/Rhonchi/Rales. Heart:  Irregular  rhythm,  No murmur, No Rubs, No Gallops  Abdomen: Soft, Non distended, Non tender. +Bowel sounds,   Extremities: No c/c/e  Psych:   Not anxious or agitated. Neurologic:  No acute neurological deficits. Admission HPI :   Francisco Sow is a 80 y.o. male who has past history of HTN, CKD is sent to ER from PCP office for A-fib with RVR. Patient is hard of hearing. He reports that he has been having leg swelling for the past one week and also SOB for the past 2 days. He has been getter progressive DAVIS. He denies any chest pain, fever/chills, N/V. He went to see his PCP and was evaluated by Dr. Alexsandra Calvillo. He was noted to be in A-fib with RVR. He was sent to ER from the office. In ER his HR above 140. He was given cardizem with no improvement in his HR. He was started on cardizem drip and heparin per cardiology recommendation. Hospital Course :   Mr. Fred Winkler was admitted to ICU initially for A-fib that required titration of his cardizem drip. It took couple of days for his rate to get controlled.  Once his HR improved, he was weaned off the cardizem drip. His HR now controlled on oral cardizem and toprol xl. He was also started on anticoagulation with heparin drip, he is not started on coumadin. No need to bridge per cardiology. Goal INR 2-3. His echo showed EF of 61-65%, LV moderate concentric hypertrophy, mild to mod AR, mod pulmonary HTN. His nuclear stress test normal.   He worked with PT/OT and recommend rehab. A-fib with RVR -  rate controlled on metoprolol succinate and cardizem. Received digoxin. Started on coumadin for anticoagulation  TSH in normal range.      CHF - acute on chronic diastolic  Started on lasix, beta blocker, no ACE-I or ARB secondary to renal insufficiency. CE flat. Echo with EF of 61-65%, moderate concentric LV hypertrophy.     Heme/onc - Follow H&H, plts.     Leukocytosis - no clear evidence of infection. CXR with no infiltrate, UA with no evidence of UTI  Received ceftriaxone. CKD - stage 3, monitored renal fundtion in the setting of diuresis. Activity: Activity as tolerated    Diet: Cardiac Diet    Follow-up: PCP, cardiology. Disposition: rehab    Minutes spent on discharge: 45       Labs: Results:       Chemistry Recent Labs     02/14/19  0432   *      K 3.3*      CO2 25   BUN 58*   CREA 3.04*   CA 8.6   AGAP 12   BUCR 19      CBC w/Diff Recent Labs     02/16/19  0356 02/14/19  0432   WBC 13.8* 16.4*   RBC 3.49* 3.78*   HGB 9.4* 10.2*   HCT 30.0* 32.8*    287      Cardiac Enzymes No results for input(s): CPK, CKND1, ZENAIDA in the last 72 hours. No lab exists for component: CKRMB, TROIP   Coagulation Recent Labs     02/16/19  0356 02/15/19  2130  02/14/19  0005   PTP 16.2*  --   --  15.9*   INR 1.3*  --   --  1.3*   APTT 105.7* 77.3*   < >  --     < > = values in this interval not displayed.        Lipid Panel No results found for: CHOL, CHOLPOCT, CHOLX, CHLST, CHOLV, 736701, HDL, LDL, LDLC, DLDLP, 906562, VLDLC, VLDL, TGLX, TRIGL, TRIGP, TGLPOCT, CHHD, CHHDX   BNP No results for input(s): BNPP in the last 72 hours. Liver Enzymes No results for input(s): TP, ALB, TBIL, AP, SGOT, GPT in the last 72 hours. No lab exists for component: DBIL   Thyroid Studies Lab Results   Component Value Date/Time    TSH 2.00 02/11/2019 04:20 PM            Significant Diagnostic Studies: Xr Chest Port    Result Date: 2/13/2019  Chest, single view Indication: Atrial fibrillation Comparison: Several prior chest radiographs, most recently 2/11/2019 Findings:  Portable upright AP view of the chest was obtained. No focal pneumonic opacity or pneumothorax. Improved appearance to trace bilateral pleural effusions. Mild central vascular congestion appears improved. Cardiac size and mediastinal contours are stable. No acute osseous abnormality. Degenerative changes of the shoulder girdles unchanged. Impression: Improved central vascular congestion with trace bilateral pleural effusions. Xr Chest Port    Result Date: 2/11/2019  Portable Chest  History: Shortness of breath, dyspnea on exertion Comparison: AP chest 03/06/2015 Portable view of the chest demonstrates stable cardiomediastinal silhouette. Interstitium is slightly indistinct and thickened suggesting vascular congestion. Right costophrenic angle is now blunted. Left costophrenic angle may be slightly blunted as well. No pneumothorax is seen. Cardiac monitoring leads are present. Degenerative changes are seen involving the shoulders. Degenerative changes are also present in the spine. IMPRESSION: Findings suggesting vascular congestion with small right pleural effusion. Additional very small left pleural effusion may be present as well. No results found for this or any previous visit.         CC: Christiane Mcclelland MD

## 2019-02-16 NOTE — PROGRESS NOTES
Problem: Mobility Impaired (Adult and Pediatric) Goal: *Acute Goals and Plan of Care (Insert Text) Physical Therapy Goals Initiated 2/15/2019 and to be accomplished within 3-7 day(s) 1. Patient will move from supine to sit and sit to supine  in bed with supervision/set-up. 2.  Patient will transfer from bed to chair and chair to bed with supervision/set-up using the least restrictive device. 3.  Patient will perform sit to stand with supervision/set-up. 4.  Patient will ambulate with supervision/set-up for 200 feet with the least restrictive device. Outcome: Progressing Towards Goal 
physical Therapy TREATMENT Patient: Kevin Burns (1 y.o. male) Date: 2/16/2019 Diagnosis: New onset atrial fibrillation ([address] [I48.91] CHF (congestive heart failure) ([address] [I50.9] New onset atrial fibrillation ([address] Precautions: Fall Chart, physical therapy assessment, plan of care and goals were reviewed. ASSESSMENT: 
Pt denies pain at this time. Pt able to participate in transfer training and gt training w/ additional time and CGA/min A for stability and safety. Pt is mildly impulsive and [place] impacting safety. Pt had one B knee buckling during gt training in hallway w/ RW, GB requiring Amanda. Pt left sitting EOB per pt request w/ bed alarm activated and all needs within reach. Nurse aware. Progression toward goals: 
[]      Improving appropriately and progressing toward goals [x]      Improving slowly and progressing toward goals 
[]      Not making progress toward goals and plan of care will be adjusted PLAN: 
Patient continues to benefit from skilled intervention to address the above impairments. Continue treatment per established plan of care. Discharge Recommendations:  Rishabh Arteaga Further Equipment Recommendations for Discharge:  N/A  
 
SUBJECTIVE:  
Patient stated I would like to get dressed.  OBJECTIVE DATA SUMMARY:  
Critical Behavior: Neurologic State: Alert, Appropriate for age Orientation Level: Oriented X4 Cognition: Follows commands, Decreased attention/concentration, Impulsive Safety/Judgement: Awareness of environment Functional Mobility Training: 
Bed Mobility: 
Supine to Sit: Contact guard assistance; Additional time(vc) 
Transfers: 
Sit to Stand: Contact guard assistance;Minimum assistance; Additional time(vc) 
Stand to Sit: Contact guard assistance; Additional time(vc) 
Balance: 
Sitting: Intact Standing: Impaired; With support Standing - Static: Fair;Constant support Standing - Dynamic : Fair Range Of Motion: 
Ambulation/Gait Training: 
Distance (ft): 140 Feet (ft) Assistive Device: Gait belt;Walker, rolling Ambulation - Level of Assistance: Contact guard assistance;Minimal assistance(vc) 
Gait Abnormalities: Decreased step clearance; Path deviations Base of Support: Narrowed Speed/Jayde: Pace decreased (<100 feet/min) Step Length: Left shortened;Right shortened Neuro Re-Education: 
Therapeutic Exercises:  
Pain: 
Pain Scale 1: Numeric (0 - 10) Pain Intensity 1: 0 Activity Tolerance:  
Fair Please refer to the flowsheet for vital signs taken during this treatment. After treatment:  
[x] Patient left in no apparent distress sitting up EOB [] Patient left in no apparent distress in bed 
[] Call bell left within reach [x] Nursing notified 
[] Caregiver present [x] Bed alarm activated Diomedes Casey, PT Time Calculation: 14 mins

## 2019-02-16 NOTE — PROGRESS NOTES
DC Plan: Discharge to [facility] rehab today 2/16. Melisa Moya. Address [address] [facility], [address] Phone [phone] Fax [phone] Patient will go to room 10 in [facility], the number for report is [phone]. Please include all hard scripts for controlled substances, med rec and dc summary in packet. Please medicate for pain prior to dc if possible and needed to help offset delay when patient first arrives to facility. Chart reviewed as CM on call. Pt accepted at [facility] rehab today. Care Management Interventions PCP Verified by CM: Yes 
Palliative Care Criteria Met (RRAT>21 & CHF Dx)?: No 
Palliative Consult Recommended?: Yes Transition of Care Consult (CM Consult): SNF Physical Therapy Consult: Yes Current Support Network: Lives with Spouse, [facility] [place] [address] Confirm Follow Up Transport: Family Plan discussed with Pt/Family/Caregiver: Yes Freedom of Choice Offered: Yes Discharge Location Discharge Placement: Skilled nursing facility

## 2019-02-16 NOTE — ROUTINE PROCESS
Assumed patient care from off going nurse Lj Ruiz. Patient resting quietly in bed and NAD noted. Call light within reach, bed alarm on, and bed in lowest position.

## 2019-02-16 NOTE — PROGRESS NOTES
Report called to Raad Reyes RN at [facility] with understanding noted. Patient and family state no concerns or questions at discharge. Patient stable for discharge.

## 2019-02-16 NOTE — PROGRESS NOTES
Problem: Falls - Risk of 
Goal: *Absence of Falls Document Rhonda Quick Fall Risk and appropriate interventions in the flowsheet. Outcome: Progressing Towards Goal 
Fall Risk Interventions: 
Mobility Interventions: Utilize walker, cane, or other assistive device, Patient to call before getting OOB, Communicate number of staff needed for ambulation/transfer, Assess mobility with egress test 
 
  
 
Medication Interventions: Evaluate medications/consider consulting pharmacy, Patient to call before getting OOB Elimination Interventions: Bed/chair exit alarm, Call light in reach, Patient to call for help with toileting needs, Toileting schedule/hourly rounds, Urinal in reach

## 2019-02-16 NOTE — ROUTINE PROCESS
Patient had an uneventful shift and is resting quietly in bed at this time. NAD noted and call light within reach. Bedside and Verbal shift change report given to Zulema Hernandez RN (oncoming nurse) by RAAM Pavon RN (offgoing nurse). Report included the following information SBAR, Kardex, Accordion and Recent Results.

## 2019-02-16 NOTE — PROGRESS NOTES
Problem: Pressure Injury - Risk of 
Goal: *Prevention of pressure injury Document Harris Scale and appropriate interventions in the flowsheet. Outcome: Progressing Towards Goal 
Pressure Injury Interventions: Activity Interventions: Increase time out of bed, Pressure redistribution bed/mattress(bed type) Mobility Interventions: HOB 30 degrees or less, Pressure redistribution bed/mattress (bed type) Nutrition Interventions: Document food/fluid/supplement intake, Offer support with meals,snacks and hydration Friction and Shear Interventions: Minimize layers, HOB 30 degrees or less, Apply protective barrier, creams and emollients

## 2019-04-21 LAB
ATRIAL RATE: 131 BPM
CALCULATED R AXIS, ECG10: 90 DEGREES
CALCULATED T AXIS, ECG11: -50 DEGREES
DIAGNOSIS, 93000: NORMAL
Q-T INTERVAL, ECG07: 280 MS
QRS DURATION, ECG06: 70 MS
QTC CALCULATION (BEZET), ECG08: 428 MS
VENTRICULAR RATE, ECG03: 141 BPM

## 2024-07-29 NOTE — PROGRESS NOTES
1230 Assumed patient care, Assessment complete 1300 Per Dr Roya Meyers, patient can transfer to Telemetry 1600 Reassessment, no change 1930 Report given to Celsa Gonzalez RN Bedside and Verbal shift change report given to Celsa Gonzalez RN (oncoming nurse) by Romeo Rogel RN (offgoing nurse). Report included the following information SBAR, Intake/Output, MAR, Recent Results and Quality Measures. Attending Attestation (For Attendings USE Only)...

## 2024-11-25 NOTE — PROGRESS NOTES
Dr. Dayami Singleton notified of patient having last dose of Coumadin over 24hrs. Patient still on Heparin drip. Per Dr. Dayami Singleton patient will be discharged to rehab and will start Coumadin this evening. Will continue to monitor. No